# Patient Record
Sex: FEMALE | Race: WHITE | Employment: FULL TIME | URBAN - METROPOLITAN AREA
[De-identification: names, ages, dates, MRNs, and addresses within clinical notes are randomized per-mention and may not be internally consistent; named-entity substitution may affect disease eponyms.]

---

## 2019-05-01 ENCOUNTER — APPOINTMENT (OUTPATIENT)
Dept: GENERAL RADIOLOGY | Age: 64
End: 2019-05-01
Payer: MEDICAID

## 2019-05-01 ENCOUNTER — HOSPITAL ENCOUNTER (EMERGENCY)
Age: 64
Discharge: HOME | End: 2019-05-01
Payer: MEDICAID

## 2019-05-01 VITALS
DIASTOLIC BLOOD PRESSURE: 71 MMHG | RESPIRATION RATE: 16 BRPM | TEMPERATURE: 98.5 F | OXYGEN SATURATION: 93 % | WEIGHT: 285 LBS | HEIGHT: 64 IN | SYSTOLIC BLOOD PRESSURE: 183 MMHG | HEART RATE: 79 BPM | BODY MASS INDEX: 48.65 KG/M2

## 2019-05-01 DIAGNOSIS — M25.561 ACUTE PAIN OF RIGHT KNEE: Primary | ICD-10-CM

## 2019-05-01 PROCEDURE — 73560 X-RAY EXAM OF KNEE 1 OR 2: CPT

## 2019-05-01 PROCEDURE — 99283 EMERGENCY DEPT VISIT LOW MDM: CPT

## 2019-05-01 RX ORDER — NAPROXEN 500 MG/1
500 TABLET ORAL 2 TIMES DAILY
Qty: 20 TABLET | Refills: 0 | Status: SHIPPED | OUTPATIENT
Start: 2019-05-01 | End: 2019-06-13

## 2019-05-01 RX ORDER — LIDOCAINE 50 MG/G
1 PATCH TOPICAL DAILY
Qty: 30 PATCH | Refills: 0 | Status: SHIPPED | OUTPATIENT
Start: 2019-05-01 | End: 2019-06-13

## 2019-05-01 ASSESSMENT — ENCOUNTER SYMPTOMS
ABDOMINAL PAIN: 0
SHORTNESS OF BREATH: 0
BACK PAIN: 0
NAUSEA: 0
DIARRHEA: 0
COLOR CHANGE: 0
VOMITING: 0
WHEEZING: 0

## 2019-05-01 ASSESSMENT — PAIN SCALES - GENERAL: PAINLEVEL_OUTOF10: 8

## 2019-05-01 NOTE — ED PROVIDER NOTES
[facility]        Pt Name: Vito Montana  MRN: [id number]  [place] 0/52/3009  Date of evaluation: 5/1/2019  Provider: Raghu Matias PA-C  PCP: Porter Styles    This patient was not seen and evaluated by the attending physician No att. providers found. CHIEF COMPLAINT       Chief Complaint   Patient presents with    Knee Pain     Pt reporting overworking R knee several weeks ago, several days ago had R knee bent under her and then felt pain when getting up. Pain with knee flexion       HISTORY OF PRESENT ILLNESS   (Location/Symptom, Timing/Onset, Context/Setting, Quality, Duration, Modifying Factors, Severity)  Note limiting factors. Vito Montana is a 66 y.o. female patient presents the emergency department for evaluation of right knee pain. Patient states she was sitting on her couch the other day with her knee in full flexion underneath her. Patient states that she went to stand up she had difficulty and pain. 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 Patient has concerns that her right knee is \"going out\". Patient states the pain is worse with knee flexion. Patient states the pain decreases largely with leg extension. Patient states when she's not moving she's not had a high degree of pain but with knee flexion the pain goes to an 8/10. Patient has not taken any medication for this pain at this time. Patient states it does not radiate. She does not have any numbness or tingling into her foot. She does not have any back pain, loss of bowel or bladder or saddle anesthesia. Patient denies any trauma to the area. Nursing Notes were all reviewed and agreed with or any disagreements were addressed  in the HPI. REVIEW OF SYSTEMS    (2-9 systems for level 4, 10 or more for level 5)     Review of Systems   Constitutional: Negative for fatigue and fever. HENT: Negative.     Eyes: Negative for visual disturbance. Respiratory: Negative for shortness of breath and wheezing. Cardiovascular: Negative for chest pain and palpitations. Gastrointestinal: Negative for abdominal pain, diarrhea, nausea and vomiting. Genitourinary: Negative for dysuria. Musculoskeletal: Positive for arthralgias (right knee). Negative for back pain, gait problem, joint swelling, myalgias, neck pain and neck stiffness. Skin: Negative for color change, pallor, rash and wound. Neurological: Negative for dizziness, syncope, light-headedness and headaches. Positives and Pertinent negatives as per HPI. Except as noted abovein the ROS, all other systems were reviewed and negative. PAST MEDICAL HISTORY   History reviewed. No pertinent past medical history. SURGICAL HISTORY     Past Surgical History:   Procedure Laterality Date    GALLBLADDER SURGERY  1988    KNEE ARTHROSCOPY  09.22.09         CURRENTMEDICATIONS       Discharge Medication List as of 5/1/2019  4:59 PM      CONTINUE these medications which have NOT CHANGED    Details   sertraline (ZOLOFT) 100 MG tablet Take 100 mg by mouth 3 times daily. triamterene-hydrochlorothiazide (DYAZIDE) 37.5-25 MG per capsule Take 1 Cap by mouth every morning. fenofibrate micronized (LOFIBRA) 134 MG capsule Take 134 mg by mouth every morning (before breakfast). ALLERGIES     Erythromycin;  Penicillins; and Nickel    FAMILYHISTORY       Family History   Problem Relation Age of Onset    Asthma Other     Cancer Other     Heart Disease Other     High Blood Pressure Other     Kidney Disease Other           SOCIAL HISTORY       Social History     Socioeconomic History    Marital status: Single     Spouse name: None    Number of children: None    Years of education: None    Highest education level: None   Occupational History    None   Social Needs    Financial resource strain: None    Food insecurity:     Worry: None     Inability: None    Transportation needs:     Medical: None     Non-medical: None   Tobacco Use    Smoking status: Passive Smoke Exposure - Never Smoker   Substance and Sexual Activity    Alcohol use: No    Drug use: No    Sexual activity: None   Lifestyle    Physical activity:     Days per week: None     Minutes per session: None    Stress: None   Relationships    Social connections:     Talks on phone: None     Gets together: None     Attends Anglican service: None     Active member of club or organization: None     Attends meetings of clubs or organizations: None     Relationship status: None    Intimate partner violence:     Fear of current or ex partner: None     Emotionally abused: None     Physically abused: None     Forced sexual activity: None   Other Topics Concern    None   Social History Narrative    None       SCREENINGS             PHYSICAL EXAM    (up to 7 for level 4, 8 or more for level 5)     ED Triage Vitals   BP Temp Temp src Pulse Resp SpO2 Height Weight   05/01/19 1611 05/01/19 1609 -- 05/01/19 1609 05/01/19 1609 05/01/19 1609 05/01/19 1609 05/01/19 1609   (!) 183/71 98.5 °F (36.9 °C)  79 16 93 % 5' 4\" (1.626 m) 285 lb (129.3 kg)       Physical Exam   Constitutional: She is oriented to person, place, and time. She appears well-developed and well-nourished. HENT:   Head: Normocephalic and atraumatic. Nose: Nose normal.   Mouth/Throat: Oropharynx is clear and moist.   Eyes: Conjunctivae and EOM are normal. Right eye exhibits no discharge. Left eye exhibits no discharge. Neck: Normal range of motion. Neck supple. Pulmonary/Chest: Effort normal. No respiratory distress. Musculoskeletal: Normal range of motion. Right knee: She exhibits normal range of motion, no swelling, no effusion, no ecchymosis, no deformity and no bony tenderness. No tenderness found. Patient's right knee is grossly normal. No swelling, erythema, warmth, laxity felt with anterior drawer testing.  Patient has pain but no laxity felt with Yi's. Mild crepitus felt with flexion and extension. Capillary refill is less than 2 seconds. Normal sensation bilaterally. Dorsalis pedis pulses 2+. Neurological: She is alert and oriented to person, place, and time. Skin: Skin is warm and dry. Capillary refill takes less than 2 seconds. No rash noted. She is not diaphoretic. No erythema. No pallor. Psychiatric: She has a normal mood and affect. Her behavior is normal.   Nursing note and vitals reviewed. DIAGNOSTIC RESULTS   LABS:    Labs Reviewed - No data to display    All other labs were within normal range or not returned as of this dictation. EKG: All EKG's are interpreted by the Emergency Department Physician who either signs orCo-signs this chart in the absence of a cardiologist.  Please see their note for interpretation of EKG. RADIOLOGY:   Non-plain film images such as CT, Ultrasound and MRI are read by the radiologist. Plain radiographic images are visualized andpreliminarily interpreted by the  ED Provider with the below findings:        Interpretation perthe Radiologist below, if available at the time of this note:    XR KNEE RIGHT (1-2 VIEWS)   Final Result   No acute abnormality. [unfilled]      PROCEDURES   Unless otherwise noted below, none     Procedures    CRITICAL CARE TIME   N/A    CONSULTS:  None      EMERGENCY DEPARTMENT COURSE and DIFFERENTIALDIAGNOSIS/MDM:   Vitals:    Vitals:    05/01/19 1609 05/01/19 1611   BP:  (!) 183/71   Pulse: 79    Resp: 16    Temp: 98.5 °F (36.9 °C)    SpO2: 93%    Weight: 285 lb (129.3 kg)    Height: 5' 4\" (1.626 m)        Patient was given thefollowing medications:  Medications - No data to display    Patient presents the emergency department for evaluation of right knee pain. Patient denies trauma to the area. Patient is alert and oriented distress. Patient has normal sensations bilaterally. Capillary refills less than 2 seconds and dorsalis pedis pulses 2+. Patient has crepitus when knee is extended or flexed. No laxity is felt with anterior drawer. Patient has pain with Yi's but no pain to palpation of the medial or lateral aspect of the knee. The knee is non-edematous non-erythematous and without additional warmth. X-ray shows no acute osseous abnormality. Patient does have joint narrowing likely degenerative due to age. Patient was given an Ace wrap for comfort and given prescription for anti-inflammatory medications. Patient told to utilize RICE therapy at home and to follow up with her orthopedic surgeon who did her previous knee replacement. Patient was also given follow-up to new orthopedic in case she cannot get a hold of her old orthopedic. At this time I feel the patient is at low risk for compartment syndrome, acute bony fracture, meniscal or ligamentous tear. FINAL IMPRESSION      1.  Acute pain of right knee          DISPOSITION/PLAN   DISPOSITION Decision To Discharge 05/01/2019 04:55:52 PM      PATIENT REFERREDTO:  [facility] Emergency Department  [address] [facility]  [phone]    If symptoms worsen    Clay Clark MD  [address] [facility], [address]  [address] [place] [address]  [phone]    Schedule an appointment as soon as possible for a visit in 3 days  for re-evaluation      DISCHARGE MEDICATIONS:  Discharge Medication List as of 5/1/2019  4:59 PM      START taking these medications    Details   naproxen (NAPROSYN) 500 MG tablet Take 1 tablet by mouth 2 times daily, Disp-20 tablet, R-0Print      lidocaine (LIDODERM) 5 % Place 1 patch onto the skin daily 12 hours on, 12 hours off., Disp-30 patch, R-0Print             DISCONTINUED MEDICATIONS:  Discharge Medication List as of 5/1/2019  4:59 PM                 (Please note that portions ofthis note were completed with a voice recognition program.  Efforts were made to edit the dictations but occasionally words are mis-transcribed.)    Stephan De Leon PA-C (electronically signed)            Ebony Chinchilla PA-C  05/01/19 [id number]

## 2019-05-09 ENCOUNTER — OFFICE VISIT (OUTPATIENT)
Dept: ORTHOPEDIC SURGERY | Age: 64
End: 2019-05-09
Payer: MEDICAID

## 2019-05-09 DIAGNOSIS — M17.11 PRIMARY OSTEOARTHRITIS OF RIGHT KNEE: ICD-10-CM

## 2019-05-09 DIAGNOSIS — M25.561 ACUTE PAIN OF RIGHT KNEE: Primary | ICD-10-CM

## 2019-05-09 PROCEDURE — 99203 OFFICE O/P NEW LOW 30 MIN: CPT | Performed by: PHYSICIAN ASSISTANT

## 2019-05-09 PROCEDURE — 20610 DRAIN/INJ JOINT/BURSA W/O US: CPT | Performed by: PHYSICIAN ASSISTANT

## 2019-05-10 NOTE — PROGRESS NOTES
This dictation was done with [employer] dictation and may contain mechanical errors related to translation. The review of systems was currently provided by the patient and reviewed with the medical assistant at today's visit. Please see media. Subjective:  Timo Price is a 66 y.o. who is here as a new patient to [facility]. She is complaining of pain in her right knee that started about a week ago she can't remember specific injury she states that the pain can be a 9 out of 10 with exertion. Her past medical history shows that she has had some arthritis in the past but it's not been affecting her until last week or so she was sent for x-rays including a standing AP lateral and a sunrise view of her right knee. Patient Active Problem List   Diagnosis    Primary localized osteoarthrosis, lower leg           Current Outpatient Medications on File Prior to Visit   Medication Sig Dispense Refill    naproxen (NAPROSYN) 500 MG tablet Take 1 tablet by mouth 2 times daily 20 tablet 0    lidocaine (LIDODERM) 5 % Place 1 patch onto the skin daily 12 hours on, 12 hours off. 30 patch 0    sertraline (ZOLOFT) 100 MG tablet Take 100 mg by mouth 3 times daily.  triamterene-hydrochlorothiazide (DYAZIDE) 37.5-25 MG per capsule Take 1 Cap by mouth every morning.  fenofibrate micronized (LOFIBRA) 134 MG capsule Take 134 mg by mouth every morning (before breakfast). No current facility-administered medications on file prior to visit. Objective:   Blood pressure (!) 165/83, pulse 71, temperature 97.2 °F (36.2 °C), height 5' 4\" (1.626 m), weight 285 lb (129.3 kg). On examination this is a very pleasant 59-year-old female no acute distress she is alert and oriented ×3 she can walk without antalgia. She has no varus or valgus laxity no cutaneous lesions or lymphadenopathy.  She has good distal pulses good dorsiflexion and plantar flexion strength she's mildly positive for Yi on the right knee she is negative for a Lockman she is negative for pivot shift. Neuro exam grossly intact both lower extremities. Intact sensation to light touch. Motor exam 4+ to 5/5 in all major motor groups. Negative Olmedo's sign. Skin is warm, dry and intact with out erythema or significant increased temperature around the knee joint(s). There are no cutaneous lesions or lymphadenopathy present. X-RAYS:  X-rays taken the office today show very minimal joint space narrowing no fractures or other significant bone abnormality and this was shown to the patient        Assessment:  Right knee mild osteoarthritis bursitis and probable medial meniscus injury    Plan:  During today's visit, there was approximately 30 minutes of face-to-face discussion in regards to the patient's current condition and treatment options. More than 50 % of the time was counseling and coordination of care. We went over the risks and benefits rationale treatment and initially we will try a cortisone injection home exercises and anti-inflammatories over-the-counter. She'll follow up with us in 3-4 weeks if she is not significantly better we would consider getting an MRI at that point      PROCEDURE NOTE:  After a discussion of the multiple options, they consented to a cortisone shot. 1 ml of 40mg/ml Kenalog and 2 ml's of 0.25%Marcaine were injected into the right knee joint space. The leg was slightly flexed and injected just lateral to the patella tendon to under the patella. This was done with sterile technique and they tolerated it well.           They will schedule a follow up in one month    Kenalog:  NDC: 9974-9536-16  Lot Number: [id number]  Expiration Date: 4/20

## 2019-05-13 ENCOUNTER — TELEPHONE (OUTPATIENT)
Dept: ORTHOPEDIC SURGERY | Age: 64
End: 2019-05-13

## 2019-05-13 DIAGNOSIS — M25.561 ACUTE PAIN OF RIGHT KNEE: Primary | ICD-10-CM

## 2019-05-13 NOTE — TELEPHONE ENCOUNTER
Patient called states that her pain symptoms have not improved since the injection on 5/9/2019. She is calling to see what the next step should be?     Please call patient to discuss:  [phone]

## 2019-05-14 VITALS
TEMPERATURE: 97.2 F | WEIGHT: 285 LBS | BODY MASS INDEX: 48.65 KG/M2 | DIASTOLIC BLOOD PRESSURE: 83 MMHG | HEART RATE: 71 BPM | SYSTOLIC BLOOD PRESSURE: 165 MMHG | HEIGHT: 64 IN

## 2019-05-23 ENCOUNTER — HOSPITAL ENCOUNTER (OUTPATIENT)
Dept: MRI IMAGING | Age: 64
Discharge: HOME | End: 2019-05-23
Payer: MEDICAID

## 2019-05-23 DIAGNOSIS — M25.561 ACUTE PAIN OF RIGHT KNEE: ICD-10-CM

## 2019-05-23 PROCEDURE — 73721 MRI JNT OF LWR EXTRE W/O DYE: CPT

## 2019-05-24 ENCOUNTER — TELEPHONE (OUTPATIENT)
Dept: ORTHOPEDIC SURGERY | Age: 64
End: 2019-05-24

## 2019-05-24 NOTE — TELEPHONE ENCOUNTER
Called and told her that this would be done on Tuesday and she was ok with this.   She stated that she is doing much better than she was

## 2019-05-24 NOTE — TELEPHONE ENCOUNTER
Patient called states she has completed her MRI. She is calling to obtain the results.     Please call patient:  [phone]

## 2019-05-30 ENCOUNTER — OFFICE VISIT (OUTPATIENT)
Dept: ORTHOPEDIC SURGERY | Age: 64
End: 2019-05-30
Payer: MEDICAID

## 2019-05-30 DIAGNOSIS — M17.11 PRIMARY OSTEOARTHRITIS OF RIGHT KNEE: Primary | ICD-10-CM

## 2019-05-30 DIAGNOSIS — S83.241A TEAR OF MEDIAL MENISCUS OF RIGHT KNEE, CURRENT, UNSPECIFIED TEAR TYPE, INITIAL ENCOUNTER: ICD-10-CM

## 2019-05-30 PROCEDURE — 99214 OFFICE O/P EST MOD 30 MIN: CPT | Performed by: PHYSICIAN ASSISTANT

## 2019-05-30 NOTE — LETTER
[facility]  Surgery Scheduling Form:  [address]:                                                                                                                  Patient Name:  Tigre Payan  Patient :  1955   Patient SS#:  [id number]    Patient Phone:  [phone] (home)                               Patient Address:  [address] [place] [address]    PCP:  [address] [facility] [address]:   Payor/Plan Subscr  Sex Relation Sub. Ins. ID Effective Group Num   1. Lauren Jhaveri  Female Self [id number] 1/1/15 [id number]                                   [address] [address]   DIAGNOSIS & PROCEDURE:                                                                                              Diagnosis:   right knee medial meniscus tear and plica    Y88.716  Operation:  Right knee scope medial meniscectomy and poss plica excision   [id number]  Location:  [place]  Surgeon:  Eneida Castillo. Leyla Zazueta MD    SCHEDULING INFORMATION:                                                                                         .  Surgeon's Scheduling Instruction:  elective  Requested Date:    OR Time:   Patient Arrival Time:    OR Time Required:  30  Minutes  Anesthesia:  General  Equipment:    Mini C-Arm:  No   Standard C-Arm:  No  Status:  Outpatient  PAT Required:  No  Comments:   ALLERGIES:Erythromycin; Penicillins; and Nickel                      Daron XMagdiel Biggs MD      19 4:37 PM  BILLING INFORMATION:                                                                                                   Procedure:                                           CPT Code Modifier     Right knee scope medial meniscectomy and poss plica excision          With Juanito Boateng, [facility]                  19  4:37 PM   Height: 5' 4\" (162.6 cm),  Weight: 285 lb (129.3 kg)  Patient: Tigre Payan  :    1955  Pre Operative Physician Prophylaxis Orders - SCIP Protocols SUR-  ALLERGIES:    SEE LIST                   Pre-Operative Antibiotic Order:          ?  ----  No Antibiotic Ordered        X  ----  Give the following Antibiotic within 1 hour prior to procedure                                                                           start time:          Ancef 1 gram IV if patient is less than 200 pounds     or        Ancef 2 grams IV if patient is greater than 200 pounds     or       Vancomycin 1 gram IV (over 1 hour) if patient is allergic to         PENICILLINS or CEFALOSPORINS                                                                                        19       4:37PM

## 2019-06-05 VITALS
HEIGHT: 64 IN | WEIGHT: 285 LBS | HEART RATE: 77 BPM | SYSTOLIC BLOOD PRESSURE: 160 MMHG | DIASTOLIC BLOOD PRESSURE: 84 MMHG | TEMPERATURE: 98.3 F | BODY MASS INDEX: 48.65 KG/M2

## 2019-06-06 NOTE — PROGRESS NOTES
This dictation was done with [employer] dictation and may contain mechanical errors related to translation. The review of systems was currently provided by the patient and reviewed with the medical assistant at today's visit. Please see media. Subjective:  Rene Bernard is a 66 y.o. who is here to discuss pain in her right knee. At this visit the X-rays, presenting symptoms, and chief complaint were presented to Sean Peters. Alcira Naranjo M.D. He then evaluated the patient. He spent several minutes discussing face to face with the patient the clinical diagnosis and medical course options,from conservative to aggressive including risks and benefits. Show recent MRI results and the results are listed below:    Impression   Medial compartmental grade 3 chondromalacia changes and degenerative changes   with joint space narrowing, articular cartilage fissuring and extensive   subchondral marrow edema.       Small joint effusion and moderate to large Baker's cyst.  Possible synovitis   and intra-articular loose bodies in the Baker's cyst.       No acute ligamentous or meniscal tear.             Patient Active Problem List   Diagnosis    Primary localized osteoarthrosis, lower leg           Current Outpatient Medications on File Prior to Visit   Medication Sig Dispense Refill    naproxen (NAPROSYN) 500 MG tablet Take 1 tablet by mouth 2 times daily 20 tablet 0    lidocaine (LIDODERM) 5 % Place 1 patch onto the skin daily 12 hours on, 12 hours off. 30 patch 0    sertraline (ZOLOFT) 100 MG tablet Take 100 mg by mouth 3 times daily.  triamterene-hydrochlorothiazide (DYAZIDE) 37.5-25 MG per capsule Take 1 Cap by mouth every morning.  fenofibrate micronized (LOFIBRA) 134 MG capsule Take 134 mg by mouth every morning (before breakfast). No current facility-administered medications on file prior to visit.           Objective:   Blood pressure (!) 160/84, pulse 77, temperature 98.3 °F (36.8 °C), height 5' 4\" (1.626 m), weight 285 lb (129.3 kg). On examination she has crepitus during flexion and extension through the patellofemoral joint she has 1+ edema she has medial and lateral joint line tenderness she has good distal pulses good dorsiflexion plantarflexion strength and is neurologically intact to the right foot. Neuro exam grossly intact both lower extremities. Intact sensation to light touch. Motor exam 4+ to 5/5 in all major motor groups. Negative Olmedo's sign. Skin is warm, dry and intact with out erythema or significant increased temperature around the knee joint(s). There are no cutaneous lesions or lymphadenopathy present. X-RAYS:  MRI films were reviewed showing chondromalacia an effusion thickened lining tissue as well as loose bodies the MRI was negative for a unstable meniscus tear but there was degenerative changes seen      Assessment:  Right knee hypertrophic plica, loose bodies, osteoarthritis    Plan:  During today's visit, there was approximately 25 minutes of face-to-face discussion in regards to the patient's current condition and treatment options. More than 50 % of the time was counseling and coordination of care. This is a lengthy discussion with the patient due to the fact of her age her activity level the findings on x-ray and MRI and her disability we discussed observation only arthroscopy versus total knee replacement.  Ultimately, she decided to proceed to a knee arthroscopy she understands risks and benefits and the rationale treatment she has expectations that it could be helpful but ultimately she could require a total knee replacement      PROCEDURE NOTE:   proceed to right knee arthroscopy      They will schedule a follow up in preoperative with

## 2019-06-13 ENCOUNTER — OFFICE VISIT (OUTPATIENT)
Dept: ORTHOPEDIC SURGERY | Age: 64
End: 2019-06-13
Payer: MEDICAID

## 2019-06-13 VITALS — SYSTOLIC BLOOD PRESSURE: 155 MMHG | HEART RATE: 84 BPM | DIASTOLIC BLOOD PRESSURE: 87 MMHG | TEMPERATURE: 98.4 F

## 2019-06-13 DIAGNOSIS — S83.241A TEAR OF MEDIAL MENISCUS OF RIGHT KNEE, CURRENT, UNSPECIFIED TEAR TYPE, INITIAL ENCOUNTER: Primary | ICD-10-CM

## 2019-06-13 PROCEDURE — 99214 OFFICE O/P EST MOD 30 MIN: CPT | Performed by: ORTHOPAEDIC SURGERY

## 2019-06-13 RX ORDER — IBUPROFEN 200 MG
200 TABLET ORAL EVERY 6 HOURS PRN
Status: ON HOLD | COMMUNITY
End: 2019-06-19 | Stop reason: HOSPADM

## 2019-06-13 NOTE — PROGRESS NOTES
This dictation was done with [employer] dictation and may contain mechanical errors related to translation. Blood pressure (!) 155/87, pulse 84, temperature 98.4 °F (36.9 °C).

## 2019-06-17 RX ORDER — ACETAMINOPHEN 500 MG
500 TABLET ORAL EVERY 6 HOURS PRN
COMMUNITY
End: 2019-10-07

## 2019-06-17 NOTE — PROGRESS NOTES
Pt states, she has allergy to any METALS other than GOLD-but it must be 14K or higher.   She bleeds at the site and ADEEL domingo [facility] office notified per Eliezer Schmidt  Allergy placed in epic

## 2019-06-17 NOTE — PROGRESS NOTES
[address]  time____________        Surgery time____________    Take the following medications with a sip of water:pt to follow med regiment as instructed by our anesthesiologist.    Bring b-pap machine in    Do not eat or drink anything after 12:00 midnight prior to your surgery. This includes water chewing gum, mints and ice chips. You may brush your teeth and gargle the morning of your surgery, but do not swallow the water     Please see your family doctor/pediatrician for a history and physical and/or concerning medications. Bring any test results/reports from your physicians office. If you are under the care of a heart doctor or specialist doctor, please be aware that you may be asked to them for clearance    You may be asked to stop blood thinners such as Coumadin, Plavix, Fragmin, Lovenox, etc., or any anti-inflammatories such as:  Aspirin, Ibuprofen, Advil, Naproxen prior to your surgery. We also ask that you stop any OTC medications such as fish oil, vitamin E, glucosamine, garlic, Multivitamins, COQ 10, etc.    We ask that you do not smoke 24 hours prior to surgery  We ask that you do not  drink any alcoholic beverages 24 hours prior to surgery     You must make arrangements for a responsible adult to take you home after your surgery. For your safety you will not be allowed to leave alone or drive yourself home. Your surgery will be cancelled if you do not have a ride home. Also for your safety, it is strongly suggested that someone stay with you the first 24 hours after your surgery. A parent or legal guardian must accompany a child scheduled for surgery and plan to stay at the hospital until the child is discharged. Please do not bring other children with you. For your comfort, please wear simple loose fitting clothing to the hospital.  Please do not bring valuables.     Do not wear any make-up or nail polish on your fingers or

## 2019-06-18 ENCOUNTER — ANESTHESIA EVENT (OUTPATIENT)
Dept: OPERATING ROOM | Age: 64
End: 2019-06-18
Payer: MEDICAID

## 2019-06-19 ENCOUNTER — ANESTHESIA (OUTPATIENT)
Dept: OPERATING ROOM | Age: 64
End: 2019-06-19
Payer: MEDICAID

## 2019-06-19 ENCOUNTER — HOSPITAL ENCOUNTER (OUTPATIENT)
Age: 64
Setting detail: OUTPATIENT SURGERY
Discharge: HOME | End: 2019-06-19
Attending: ORTHOPAEDIC SURGERY | Admitting: ORTHOPAEDIC SURGERY
Payer: MEDICAID

## 2019-06-19 VITALS
HEART RATE: 88 BPM | HEIGHT: 64 IN | OXYGEN SATURATION: 95 % | WEIGHT: 285 LBS | TEMPERATURE: 96.8 F | SYSTOLIC BLOOD PRESSURE: 128 MMHG | BODY MASS INDEX: 48.65 KG/M2 | RESPIRATION RATE: 16 BRPM | DIASTOLIC BLOOD PRESSURE: 79 MMHG

## 2019-06-19 VITALS
SYSTOLIC BLOOD PRESSURE: 112 MMHG | RESPIRATION RATE: 22 BRPM | DIASTOLIC BLOOD PRESSURE: 58 MMHG | TEMPERATURE: 98.6 F | OXYGEN SATURATION: 97 %

## 2019-06-19 DIAGNOSIS — S83.241A ACUTE MEDIAL MENISCUS TEAR, RIGHT, INITIAL ENCOUNTER: Primary | ICD-10-CM

## 2019-06-19 PROCEDURE — 2580000003 HC RX 258: Performed by: ORTHOPAEDIC SURGERY

## 2019-06-19 PROCEDURE — 3700000001 HC ADD 15 MINUTES (ANESTHESIA): Performed by: ORTHOPAEDIC SURGERY

## 2019-06-19 PROCEDURE — 6360000002 HC RX W HCPCS: Performed by: ORTHOPAEDIC SURGERY

## 2019-06-19 PROCEDURE — 6360000002 HC RX W HCPCS: Performed by: NURSE ANESTHETIST, CERTIFIED REGISTERED

## 2019-06-19 PROCEDURE — 7100000001 HC PACU RECOVERY - ADDTL 15 MIN: Performed by: ORTHOPAEDIC SURGERY

## 2019-06-19 PROCEDURE — 3700000000 HC ANESTHESIA ATTENDED CARE: Performed by: ORTHOPAEDIC SURGERY

## 2019-06-19 PROCEDURE — 6360000002 HC RX W HCPCS: Performed by: ANESTHESIOLOGY

## 2019-06-19 PROCEDURE — 2500000003 HC RX 250 WO HCPCS: Performed by: ORTHOPAEDIC SURGERY

## 2019-06-19 PROCEDURE — 3600000014 HC SURGERY LEVEL 4 ADDTL 15MIN: Performed by: ORTHOPAEDIC SURGERY

## 2019-06-19 PROCEDURE — 2580000003 HC RX 258: Performed by: ANESTHESIOLOGY

## 2019-06-19 PROCEDURE — 2500000003 HC RX 250 WO HCPCS: Performed by: NURSE ANESTHETIST, CERTIFIED REGISTERED

## 2019-06-19 PROCEDURE — 7100000011 HC PHASE II RECOVERY - ADDTL 15 MIN: Performed by: ORTHOPAEDIC SURGERY

## 2019-06-19 PROCEDURE — 2709999900 HC NON-CHARGEABLE SUPPLY: Performed by: ORTHOPAEDIC SURGERY

## 2019-06-19 PROCEDURE — 7100000010 HC PHASE II RECOVERY - FIRST 15 MIN: Performed by: ORTHOPAEDIC SURGERY

## 2019-06-19 PROCEDURE — 7100000000 HC PACU RECOVERY - FIRST 15 MIN: Performed by: ORTHOPAEDIC SURGERY

## 2019-06-19 PROCEDURE — 3600000004 HC SURGERY LEVEL 4 BASE: Performed by: ORTHOPAEDIC SURGERY

## 2019-06-19 RX ORDER — MORPHINE SULFATE 2 MG/ML
1 INJECTION, SOLUTION INTRAMUSCULAR; INTRAVENOUS EVERY 5 MIN PRN
Status: DISCONTINUED | OUTPATIENT
Start: 2019-06-19 | End: 2019-06-19 | Stop reason: HOSPADM

## 2019-06-19 RX ORDER — MEPERIDINE HYDROCHLORIDE 25 MG/ML
12.5 INJECTION INTRAMUSCULAR; INTRAVENOUS; SUBCUTANEOUS EVERY 5 MIN PRN
Status: DISCONTINUED | OUTPATIENT
Start: 2019-06-19 | End: 2019-06-19 | Stop reason: HOSPADM

## 2019-06-19 RX ORDER — PROPOFOL 10 MG/ML
INJECTION, EMULSION INTRAVENOUS PRN
Status: DISCONTINUED | OUTPATIENT
Start: 2019-06-19 | End: 2019-06-19 | Stop reason: SDUPTHER

## 2019-06-19 RX ORDER — MORPHINE SULFATE 4 MG/ML
INJECTION, SOLUTION INTRAMUSCULAR; INTRAVENOUS
Status: COMPLETED | OUTPATIENT
Start: 2019-06-19 | End: 2019-06-19

## 2019-06-19 RX ORDER — MORPHINE SULFATE 2 MG/ML
2 INJECTION, SOLUTION INTRAMUSCULAR; INTRAVENOUS EVERY 5 MIN PRN
Status: DISCONTINUED | OUTPATIENT
Start: 2019-06-19 | End: 2019-06-19 | Stop reason: HOSPADM

## 2019-06-19 RX ORDER — KETOROLAC TROMETHAMINE 30 MG/ML
INJECTION, SOLUTION INTRAMUSCULAR; INTRAVENOUS PRN
Status: DISCONTINUED | OUTPATIENT
Start: 2019-06-19 | End: 2019-06-19 | Stop reason: SDUPTHER

## 2019-06-19 RX ORDER — SODIUM CHLORIDE 0.9 % (FLUSH) 0.9 %
10 SYRINGE (ML) INJECTION EVERY 12 HOURS SCHEDULED
Status: DISCONTINUED | OUTPATIENT
Start: 2019-06-19 | End: 2019-06-19 | Stop reason: HOSPADM

## 2019-06-19 RX ORDER — LIDOCAINE HYDROCHLORIDE 20 MG/ML
INJECTION, SOLUTION EPIDURAL; INFILTRATION; INTRACAUDAL; PERINEURAL PRN
Status: DISCONTINUED | OUTPATIENT
Start: 2019-06-19 | End: 2019-06-19 | Stop reason: SDUPTHER

## 2019-06-19 RX ORDER — BUPIVACAINE HYDROCHLORIDE AND EPINEPHRINE 5; 5 MG/ML; UG/ML
INJECTION, SOLUTION EPIDURAL; INTRACAUDAL; PERINEURAL
Status: COMPLETED | OUTPATIENT
Start: 2019-06-19 | End: 2019-06-19

## 2019-06-19 RX ORDER — ONDANSETRON 2 MG/ML
4 INJECTION INTRAMUSCULAR; INTRAVENOUS
Status: DISCONTINUED | OUTPATIENT
Start: 2019-06-19 | End: 2019-06-19 | Stop reason: HOSPADM

## 2019-06-19 RX ORDER — OXYCODONE HYDROCHLORIDE AND ACETAMINOPHEN 5; 325 MG/1; MG/1
1 TABLET ORAL PRN
Status: DISCONTINUED | OUTPATIENT
Start: 2019-06-19 | End: 2019-06-19 | Stop reason: HOSPADM

## 2019-06-19 RX ORDER — BUPIVACAINE HYDROCHLORIDE 5 MG/ML
INJECTION, SOLUTION EPIDURAL; INTRACAUDAL
Status: COMPLETED | OUTPATIENT
Start: 2019-06-19 | End: 2019-06-19

## 2019-06-19 RX ORDER — SODIUM CHLORIDE 0.9 % (FLUSH) 0.9 %
10 SYRINGE (ML) INJECTION PRN
Status: DISCONTINUED | OUTPATIENT
Start: 2019-06-19 | End: 2019-06-19 | Stop reason: HOSPADM

## 2019-06-19 RX ORDER — OXYCODONE HYDROCHLORIDE AND ACETAMINOPHEN 5; 325 MG/1; MG/1
2 TABLET ORAL PRN
Status: DISCONTINUED | OUTPATIENT
Start: 2019-06-19 | End: 2019-06-19 | Stop reason: HOSPADM

## 2019-06-19 RX ORDER — OXYCODONE HYDROCHLORIDE AND ACETAMINOPHEN 5; 325 MG/1; MG/1
1-2 TABLET ORAL EVERY 6 HOURS PRN
Qty: 10 TABLET | Refills: 0 | Status: SHIPPED | OUTPATIENT
Start: 2019-06-19 | End: 2019-07-01 | Stop reason: SDUPTHER

## 2019-06-19 RX ORDER — HYDRALAZINE HYDROCHLORIDE 20 MG/ML
5 INJECTION INTRAMUSCULAR; INTRAVENOUS
Status: DISCONTINUED | OUTPATIENT
Start: 2019-06-19 | End: 2019-06-19 | Stop reason: HOSPADM

## 2019-06-19 RX ORDER — NAPROXEN 500 MG/1
500 TABLET ORAL 2 TIMES DAILY WITH MEALS
Qty: 20 TABLET | Refills: 3 | Status: SHIPPED | OUTPATIENT
Start: 2019-06-19 | End: 2019-10-07

## 2019-06-19 RX ORDER — MIDAZOLAM HYDROCHLORIDE 1 MG/ML
INJECTION INTRAMUSCULAR; INTRAVENOUS PRN
Status: DISCONTINUED | OUTPATIENT
Start: 2019-06-19 | End: 2019-06-19 | Stop reason: SDUPTHER

## 2019-06-19 RX ORDER — SODIUM CHLORIDE 9 MG/ML
INJECTION, SOLUTION INTRAVENOUS CONTINUOUS
Status: DISCONTINUED | OUTPATIENT
Start: 2019-06-19 | End: 2019-06-19 | Stop reason: HOSPADM

## 2019-06-19 RX ORDER — DEXAMETHASONE SODIUM PHOSPHATE 4 MG/ML
INJECTION, SOLUTION INTRA-ARTICULAR; INTRALESIONAL; INTRAMUSCULAR; INTRAVENOUS; SOFT TISSUE PRN
Status: DISCONTINUED | OUTPATIENT
Start: 2019-06-19 | End: 2019-06-19 | Stop reason: SDUPTHER

## 2019-06-19 RX ORDER — LABETALOL HYDROCHLORIDE 5 MG/ML
5 INJECTION, SOLUTION INTRAVENOUS EVERY 10 MIN PRN
Status: DISCONTINUED | OUTPATIENT
Start: 2019-06-19 | End: 2019-06-19 | Stop reason: HOSPADM

## 2019-06-19 RX ORDER — ONDANSETRON 2 MG/ML
INJECTION INTRAMUSCULAR; INTRAVENOUS PRN
Status: DISCONTINUED | OUTPATIENT
Start: 2019-06-19 | End: 2019-06-19 | Stop reason: SDUPTHER

## 2019-06-19 RX ORDER — MAGNESIUM HYDROXIDE 1200 MG/15ML
LIQUID ORAL CONTINUOUS PRN
Status: COMPLETED | OUTPATIENT
Start: 2019-06-19 | End: 2019-06-19

## 2019-06-19 RX ADMIN — Medication 3 G: at 12:21

## 2019-06-19 RX ADMIN — HYDROMORPHONE HYDROCHLORIDE 0.25 MG: 1 INJECTION, SOLUTION INTRAMUSCULAR; INTRAVENOUS; SUBCUTANEOUS at 13:25

## 2019-06-19 RX ADMIN — HYDROMORPHONE HYDROCHLORIDE 0.25 MG: 1 INJECTION, SOLUTION INTRAMUSCULAR; INTRAVENOUS; SUBCUTANEOUS at 13:42

## 2019-06-19 RX ADMIN — HYDROMORPHONE HYDROCHLORIDE 0.25 MG: 1 INJECTION, SOLUTION INTRAMUSCULAR; INTRAVENOUS; SUBCUTANEOUS at 13:34

## 2019-06-19 RX ADMIN — DEXAMETHASONE SODIUM PHOSPHATE 4 MG: 4 INJECTION, SOLUTION INTRAMUSCULAR; INTRAVENOUS at 12:31

## 2019-06-19 RX ADMIN — HYDROMORPHONE HYDROCHLORIDE 0.5 MG: 1 INJECTION, SOLUTION INTRAMUSCULAR; INTRAVENOUS; SUBCUTANEOUS at 12:56

## 2019-06-19 RX ADMIN — PROPOFOL 160 MG: 10 INJECTION, EMULSION INTRAVENOUS at 12:26

## 2019-06-19 RX ADMIN — HYDROMORPHONE HYDROCHLORIDE 0.5 MG: 1 INJECTION, SOLUTION INTRAMUSCULAR; INTRAVENOUS; SUBCUTANEOUS at 12:51

## 2019-06-19 RX ADMIN — HYDROMORPHONE HYDROCHLORIDE 1 MG: 1 INJECTION, SOLUTION INTRAMUSCULAR; INTRAVENOUS; SUBCUTANEOUS at 12:24

## 2019-06-19 RX ADMIN — KETOROLAC TROMETHAMINE 30 MG: 30 INJECTION, SOLUTION INTRAMUSCULAR at 12:54

## 2019-06-19 RX ADMIN — LIDOCAINE HYDROCHLORIDE 50 MG: 20 INJECTION, SOLUTION EPIDURAL; INFILTRATION; INTRACAUDAL; PERINEURAL at 12:24

## 2019-06-19 RX ADMIN — ONDANSETRON 4 MG: 2 INJECTION INTRAMUSCULAR; INTRAVENOUS at 12:36

## 2019-06-19 RX ADMIN — SODIUM CHLORIDE: 9 INJECTION, SOLUTION INTRAVENOUS at 10:18

## 2019-06-19 RX ADMIN — MIDAZOLAM 2 MG: 1 INJECTION INTRAMUSCULAR; INTRAVENOUS at 12:20

## 2019-06-19 ASSESSMENT — PULMONARY FUNCTION TESTS
PIF_VALUE: 18
PIF_VALUE: 30
PIF_VALUE: 30
PIF_VALUE: 12
PIF_VALUE: 14
PIF_VALUE: 30
PIF_VALUE: 14
PIF_VALUE: 20
PIF_VALUE: 1
PIF_VALUE: 5
PIF_VALUE: 30
PIF_VALUE: 1
PIF_VALUE: 14
PIF_VALUE: 12
PIF_VALUE: 10
PIF_VALUE: 17
PIF_VALUE: 18
PIF_VALUE: 11
PIF_VALUE: 24
PIF_VALUE: 24
PIF_VALUE: 21
PIF_VALUE: 12
PIF_VALUE: 12
PIF_VALUE: 30
PIF_VALUE: 21
PIF_VALUE: 10
PIF_VALUE: 12
PIF_VALUE: 11
PIF_VALUE: 3
PIF_VALUE: 30
PIF_VALUE: 12
PIF_VALUE: 1
PIF_VALUE: 12
PIF_VALUE: 1
PIF_VALUE: 30
PIF_VALUE: 24
PIF_VALUE: 10
PIF_VALUE: 0
PIF_VALUE: 21
PIF_VALUE: 1

## 2019-06-19 ASSESSMENT — PAIN DESCRIPTION - PAIN TYPE
TYPE: SURGICAL PAIN

## 2019-06-19 ASSESSMENT — PAIN DESCRIPTION - LOCATION
LOCATION: KNEE

## 2019-06-19 ASSESSMENT — PAIN SCALES - GENERAL
PAINLEVEL_OUTOF10: 3
PAINLEVEL_OUTOF10: 5
PAINLEVEL_OUTOF10: 3

## 2019-06-19 ASSESSMENT — PAIN DESCRIPTION - FREQUENCY
FREQUENCY: CONTINUOUS

## 2019-06-19 ASSESSMENT — PAIN DESCRIPTION - ONSET
ONSET: ON-GOING

## 2019-06-19 ASSESSMENT — PAIN DESCRIPTION - DESCRIPTORS
DESCRIPTORS: OTHER (COMMENT)
DESCRIPTORS: THROBBING
DESCRIPTORS: THROBBING

## 2019-06-19 ASSESSMENT — PAIN DESCRIPTION - ORIENTATION
ORIENTATION: RIGHT

## 2019-06-19 ASSESSMENT — PAIN DESCRIPTION - PROGRESSION
CLINICAL_PROGRESSION: NOT CHANGED
CLINICAL_PROGRESSION: NOT CHANGED

## 2019-06-19 ASSESSMENT — PAIN - FUNCTIONAL ASSESSMENT: PAIN_FUNCTIONAL_ASSESSMENT: 0-10

## 2019-06-19 NOTE — ANESTHESIA PRE PROCEDURE
[facility] Department of Anesthesiology  Pre-Anesthesia Evaluation/Consultation       Name:  Latesha Bates  : 1955  Age:  66 y.o. MRN:  [id number]  Date: 2019           Surgeon: Surgeon(s):  Ann Olmos MD    Procedure: Procedure(s):  RIGHT KNEE ARTHROSCOPY, MEDIAL MENISCECTOMY AND POSSIBLE PLICA EXCISION     Allergies   Allergen Reactions    Penicillins Other (See Comments)     Pt was a child and stopped breathing on this medication    Other Other (See Comments)     Pt states, any METALS other than GOLD-but it must be 14K or higher.  Erythromycin Nausea And Vomiting    Nickel Rash     Patient Active Problem List   Diagnosis    Primary localized osteoarthrosis, lower leg     Past Medical History:   Diagnosis Date    Arthritis     Depression     Hyperlipidemia     Hypertension     Psoriasis     Sleep apnea     bi pap machine    Wears dentures      Past Surgical History:   Procedure Laterality Date    DENTAL SURGERY      implants    EYE SURGERY      bilateral    GALLBLADDER SURGERY      KNEE ARTHROSCOPY  09     Social History     Tobacco Use    Smoking status: Former Smoker     Packs/day: 0.50     Types: Cigarettes    Smokeless tobacco: Never Used   Substance Use Topics    Alcohol use: No    Drug use: No     Medications  No current facility-administered medications on file prior to encounter.       Current Outpatient Medications on File Prior to Encounter   Medication Sig Dispense Refill    acetaminophen (TYLENOL) 500 MG tablet Take 500 mg by mouth every 6 hours as needed for Pain      sertraline (ZOLOFT) 100 MG tablet Take 300 mg by mouth daily        Current Facility-Administered Medications   Medication Dose Route Frequency Provider Last Rate Last Dose    0.9 % sodium chloride infusion   Intravenous Continuous Negrito Inrgam MD 75 mL/hr at 19 1018      sodium chloride flush 0.9 % injection 10 mL  10 mL Intravenous 2 times per day Rosalia Brandt MD        sodium chloride flush 0.9 % injection 10 mL  10 mL Intravenous PRN Rosalia Brandt MD        vancomycin (VANCOCIN) 1500 mg in dextrose 5 % 250 mL IVPB  1,500 mg Intravenous On Call to OR Donlad Lugo MD         Vital Signs (Current)   Vitals:    19 1217 19 0954   BP:  130/75   Pulse:  75   Resp:  18   Temp:  97.4 °F (36.3 °C)   TempSrc:  Temporal   SpO2:  96%   Weight: 285 lb (129.3 kg)    Height: 5' 4\" (1.626 m)                                           BP Readings from Last 3 Encounters:   19 130/75   19 (!) 155/87   19 (!) 160/84     Vital Signs Statistics (for past 48 hrs)     Temp  Av.4 °F (36.3 °C)  Min: 97.4 °F (36.3 °C)   Min taken time: 19 0954  Max: 97.4 °F (36.3 °C)   Max taken time: 19 0954  Pulse  Av  Min: 76   Min taken time: 19 0954  Max: 76   Max taken time: 19 0954  Resp  Av  Min: 25   Min taken time: 19 0954  Max: 25   Max taken time: 19 0954  BP  Min: 130/75   Min taken time: 19 0954  Max: 130/75   Max taken time: 19 0954  SpO2  Av %  Min: 96 %   Min taken time: 19 0954  Max: 96 %   Max taken time: 19 0954  BP Readings from Last 3 Encounters:   19 130/75   19 (!) 155/87   19 (!) 160/84       BMI  Body mass index is 48.92 kg/m². Estimated body mass index is 48.92 kg/m² as calculated from the following:    Height as of this encounter: 5' 4\" (1.626 m). Weight as of this encounter: 285 lb (129.3 kg).     CBC   Lab Results   Component Value Date    WBC 5.6 2010    RBC 3.76 2010    HGB 10.0 2010    HCT 27.8 2010    MCV 90.4 2010    RDW 13.8 2010     2010     CMP    Lab Results   Component Value Date     2010    K 3.3 2010     2010    CO2 28 2010    BUN 11 2010    CREATININE 0.6 2010    GFRAA >60 2010 GLUCOSE 110 04/06/2010    CALCIUM 9.4 04/06/2010     BMP    Lab Results   Component Value Date     04/06/2010    K 3.3 04/06/2010     04/06/2010    CO2 28 04/06/2010    BUN 11 04/06/2010    CREATININE 0.6 04/06/2010    CALCIUM 9.4 04/06/2010    GFRAA >60 04/06/2010    GLUCOSE 110 04/06/2010     POCGlucose  No results for input(s): GLUCOSE in the last 72 hours. Coags    Lab Results   Component Value Date    PROTIME 29.7 04/19/2010    INR 2.72 04/19/2010    APTT 34.5 01/49/4697     HCG (If Applicable) No results found for: PREGTESTUR, PREGSERUM, HCG, HCGQUANT   ABGs No results found for: PHART, PO2ART, QXF1NEI, QMO9JSZ, BEART, X1IZCSSK   Type & Screen (If Applicable)  Lab Results   Component Value Date    LABABO A 04/06/2010    [facility] RORO Positive 04/06/2010                            BMI: Wt Readings from Last 3 Encounters:       NPO Status:   Date of last liquid consumption: 06/18/19   Time of last liquid consumption: 2330   Date of last solid food consumption: 06/18/19      Time of last solid consumption: 2345       Anesthesia Evaluation    Airway: Mallampati: III  TM distance: >3 FB   Neck ROM: full  Mouth opening: > = 3 FB Dental:          Pulmonary:   (+) sleep apnea:                             Cardiovascular:    (+) hypertension:, hyperlipidemia                  Neuro/Psych:   (+) psychiatric history:            GI/Hepatic/Renal:             Endo/Other:    (+) : arthritis:., .                 Abdominal:           Vascular:                                        Anesthesia Plan      general     ASA 3     (I discussed with the patient the risks and benefits of PIV, general anesthesia, IV Narcotics, PACU. All questions were answered the patient agrees with the plan.)  Induction: intravenous. Anesthetic plan and risks discussed with patient. Plan discussed with CRNA.                   This pre-anesthesia assessment may be used as a history and physical.    DOS STAFF ADDENDUM:    Pt seen and

## 2019-06-19 NOTE — ANESTHESIA POSTPROCEDURE EVALUATION
Department of Anesthesiology  Postprocedure Note    Patient: Radha Peters  MRN: [id number]  YOB: 1955  Date of evaluation: 6/19/2019  Time:  2:47 PM     Procedure Summary     Date:  06/19/19 Room / Location:  [email or web address] OR 01 / [email or web address] OR    Anesthesia Start:  1222 Anesthesia Stop:  1303    Procedure:  DIAGNOSTIC RIGHT KNEE ARTHROSCOPY, PARTIAL MEDIAL AND LATERAL MENISCECTOMIES AND  PLICA EXCISION (Right Knee) Diagnosis:       Knee meniscus pain, right      (RIGHT KNEE MEDIAL MENISCUS TEAR AND PLICA)    Surgeon:  Gianna Andres MD Responsible Provider:  Дмитрий Gerard MD    Anesthesia Type:  general ASA Status:  3          Anesthesia Type: general    Denisse Phase I: Denisse Score: 10    Denisse Phase II: Denisse Score: 10    Last vitals: Reviewed and per EMR flowsheets.        Anesthesia Post Evaluation    Patient location during evaluation: PACU  Level of consciousness: awake and alert  Airway patency: patent  Nausea & Vomiting: no nausea and no vomiting  Complications: no  Cardiovascular status: blood pressure returned to baseline  Respiratory status: acceptable  Hydration status: euvolemic  Comments: Postoperative Anesthesia Note    Name:    Radha Peters  MRN:      [id number]    Patient Vitals in the past 12 hrs:  06/19/19 1421, BP:(!) 144/79, Temp:96.8 °F (36 °C), Temp src:Temporal, Pulse:69, Resp:16, SpO2:95 %  06/19/19 1405, BP:131/73, Pulse:69, Resp:14, SpO2:96 %  06/19/19 1400, BP:(!) 118/58, Pulse:76, Resp:13, SpO2:92 %  06/19/19 1355, BP:136/76, Pulse:71, Resp:13, SpO2:92 %  06/19/19 1350, BP:138/70, Pulse:72, Resp:16, SpO2:91 %  06/19/19 1347, Temp:97 °F (36.1 °C), Temp src:Temporal  06/19/19 1345, Pulse:82, Resp:13, SpO2:93 %  06/19/19 1340, BP:135/74, Pulse:85, Resp:17, SpO2:95 %  06/19/19 1335, BP:132/69, Pulse:78, Resp:13, SpO2:98 %  06/19/19 1330, BP:137/74, Pulse:77, Resp:13, SpO2:95 %  06/19/19 1325, BP:134/79, Pulse:85, Resp:19, SpO2:98 %  06/19/19 1320, BP:111/70, Pulse:71,

## 2019-06-20 NOTE — OP NOTE
high-grade  chondromalacia not only of patella facets but also the trochlea of the  femur, grade 3 to near grade 4 changes were seen throughout. The ACL  was viewed and it was definitively lax in nature though the patient had  no previous history of injury. The PCL was intact and medial and  lateral collateral ligaments were intact. The scope was eventually introduced into the lateral compartment, which  showed some grade 1 to 2 changes of the lateral femur and lateral  proximal tibia. There was a large, what appeared to be, acute meniscal  tear of the lateral meniscus. Debriding instrument was placed within  this and partial lateral meniscectomy was performed. The medial side  was then viewed and there was again severe chondral changes of the  distal femur, grade 3 to near grade 4 with grade 2 to 3 changes on the  proximal tibia. On the medial side, there was an acute and  acute-on-chronic posterior meniscal tear. Debriding and handheld  instruments were placed within the knee joint and the patient underwent  medial meniscectomy at this time. Photographs were obtained of all structures before and after  debridement. The patient tolerated the procedure well. The knee was  irrigated out, injected with Marcaine, epinephrine, and morphine and the  wounds were closed with nylon. A dressing was applied, and the patient  was brought to recovery room in stable condition.         Hope Kohli MD    D: 06/19/2019 13:06:09       T: 06/19/2019 19:45:33     [email or web address]  Job#: [id number]     Doc#: [id number]    CC:

## 2019-06-21 NOTE — PROGRESS NOTES
Patient is 24-year-old female is here for preoperative discussion of right knee arthroscopy and debridement. This is to be performed on 5/23/2019. Patient has increasing pain and difficulty with ambulation. She is status post a left total knee arthroplasty performed 9 years ago by myself. As far as her right knee is concerned she has no history of injury or surgery. Patient does not smoke does not have diabetes and has no history of DVT. There is no metal allergies and the patient does not complain of any significant back pain. She has been treated conservatively with anti-inflammatories and cortisone injections which have not given her any significant relief. She underwent MRI on 5/9/2019 which showed medial compartment grade III chondromalacia and extensive changes and cartilage fissuring noted. There is also some degenerative type tearing of the medial meniscus noted. She is here for further discussion and preoperative evaluation for right knee arthroscopy and medial meniscectomy with debridement. Patient Active Problem List   Diagnosis    Primary localized osteoarthrosis, lower leg    Acute medial meniscus tear, right, initial encounter     Prior to Visit Medications    Medication Sig Taking? Authorizing Provider   sertraline (ZOLOFT) 100 MG tablet Take 300 mg by mouth daily  Yes Historical Provider, MD   oxyCODONE-acetaminophen (PERCOCET) 5-325 MG per tablet Take 1-2 tablets by mouth every 6 hours as needed for Pain for up to 7 days. Raj Redmond MD   naproxen (NAPROSYN) 500 MG tablet Take 1 tablet by mouth 2 times daily (with meals)  Raj Rdemond MD   acetaminophen (TYLENOL) 500 MG tablet Take 500 mg by mouth every 6 hours as needed for Pain  Historical Provider, MD     Physical examination 24-year-old female oriented x3 temperature is 98.4. Patient has high class III morbid obesity BMI of 48.92.   Examination of her back shows mild decreased range of motion but no specific pain tenderness or instability. Motor exam to the right lower extremity shows quadriceps hamstrings hip abductors and abductors foot plantar dorsiflexors are intact 4-5 over 5. Sensation and perfusion are intact to the right foot and ankle. There is no numbness or tingling noted in the right lower extremity. Deep tendon reflexes are 0 to need 0 at the ankle of the right lower extremity. No other obvious cutaneous lesions lymphedema or cellulitic processes are seen in the right lower extremity. Examination of the right knee shows a moderate effusion medial joint line tenderness to palpation with near full extension and her calf on thigh flexion. This patient is morbidly obese and her range of motion is restricted by her overall body habitus. There is no obvious signs of instability or deep sepsis noted in the right knee. X-rays in the past of shown early medial degenerative changes but no bone-on-bone findings. Impression 72-year-old morbidly obese female with degenerative meniscal tearing and early degenerative osteoarthritis. This patient will probably eventually need to undergo knee arthroplasty however in the interim as she is trying to lose weight and good control of her overall morbid obesity we discussed with her knee arthroscopy with debridement. We did tell her that more than likely she will eventually need to go onto some type of knee arthroplasty and it would be in her best interest to lose a significant amount of weight. We had a 35 minute face-to-face discussion of which greater than 50% of the time was spent in counseling with this patient concerning right knee diagnostic video arthroscopy with partial meniscectomy and debridement  it's preoperative evaluation and its postoperative pain management and follow-up.   We went over the surgical procedure risks and complications including bleeding, infection,  neurovascular injury, decreased range of motion, persistent pain, instability, DVT,

## 2019-06-26 ENCOUNTER — TELEPHONE (OUTPATIENT)
Dept: ORTHOPEDIC SURGERY | Age: 64
End: 2019-06-26

## 2019-06-27 ENCOUNTER — OFFICE VISIT (OUTPATIENT)
Dept: ORTHOPEDIC SURGERY | Age: 64
End: 2019-06-27

## 2019-06-27 VITALS — BODY MASS INDEX: 48.65 KG/M2 | WEIGHT: 285 LBS | HEIGHT: 64 IN

## 2019-06-27 DIAGNOSIS — S83.241A TEAR OF MEDIAL MENISCUS OF RIGHT KNEE, CURRENT, UNSPECIFIED TEAR TYPE, INITIAL ENCOUNTER: Primary | ICD-10-CM

## 2019-06-27 PROCEDURE — 99024 POSTOP FOLLOW-UP VISIT: CPT | Performed by: PHYSICIAN ASSISTANT

## 2019-06-27 NOTE — PROGRESS NOTES
This dictation was done with [employer] dictation and may contain mechanical errors related to translation. Height 5' 4\" (1.626 m), weight 285 lb (129.3 kg). This is a pleasant 59-year-old female who is here after a right knee arthroscopy on 6/19/2019. She is here couple days early as she was getting some calf pain and called the office yesterday. When she woke up today she had a lot less calf pain more function and almost canceled the appointment but she kept it to be evaluated. She does have ecchymosis and bruising her incisions are intact and the sutures are still present. However she has a negative Homans test negative Mccray test and 0 to 130 degrees of motion. We reviewed the signs and symptoms of blood clotting and she has what to do if she gets there is otherwise we will see her in follow-up on Monday for removal of the stitches.     I did take the time while she was here to get through the scope pictures with her and explain short and long-term expectations

## 2019-07-01 ENCOUNTER — OFFICE VISIT (OUTPATIENT)
Dept: ORTHOPEDIC SURGERY | Age: 64
End: 2019-07-01

## 2019-07-01 VITALS — RESPIRATION RATE: 16 BRPM | HEIGHT: 64 IN | TEMPERATURE: 97.1 F | WEIGHT: 285 LBS | BODY MASS INDEX: 48.65 KG/M2

## 2019-07-01 DIAGNOSIS — S83.241A ACUTE MEDIAL MENISCUS TEAR, RIGHT, INITIAL ENCOUNTER: ICD-10-CM

## 2019-07-01 PROCEDURE — 99024 POSTOP FOLLOW-UP VISIT: CPT | Performed by: PHYSICIAN ASSISTANT

## 2019-07-01 RX ORDER — OXYCODONE HYDROCHLORIDE AND ACETAMINOPHEN 5; 325 MG/1; MG/1
1-2 TABLET ORAL EVERY 6 HOURS PRN
Qty: 40 TABLET | Refills: 0 | Status: SHIPPED | OUTPATIENT
Start: 2019-07-01 | End: 2019-07-08

## 2019-08-05 ENCOUNTER — OFFICE VISIT (OUTPATIENT)
Dept: ORTHOPEDIC SURGERY | Age: 64
End: 2019-08-05

## 2019-08-05 VITALS — TEMPERATURE: 97.8 F | WEIGHT: 285 LBS | BODY MASS INDEX: 48.65 KG/M2 | HEIGHT: 64 IN

## 2019-08-05 DIAGNOSIS — S83.241A ACUTE MEDIAL MENISCUS TEAR, RIGHT, INITIAL ENCOUNTER: Primary | ICD-10-CM

## 2019-08-05 PROCEDURE — 99024 POSTOP FOLLOW-UP VISIT: CPT | Performed by: PHYSICIAN ASSISTANT

## 2019-10-07 ENCOUNTER — OFFICE VISIT (OUTPATIENT)
Dept: ORTHOPEDIC SURGERY | Age: 64
End: 2019-10-07
Payer: COMMERCIAL

## 2019-10-07 VITALS
SYSTOLIC BLOOD PRESSURE: 157 MMHG | DIASTOLIC BLOOD PRESSURE: 83 MMHG | BODY MASS INDEX: 50.02 KG/M2 | HEIGHT: 64 IN | TEMPERATURE: 98.6 F | HEART RATE: 67 BPM | WEIGHT: 293 LBS

## 2019-10-07 DIAGNOSIS — S83.241A ACUTE MEDIAL MENISCUS TEAR, RIGHT, INITIAL ENCOUNTER: Primary | ICD-10-CM

## 2019-10-07 DIAGNOSIS — S83.241A TEAR OF MEDIAL MENISCUS OF RIGHT KNEE, CURRENT, UNSPECIFIED TEAR TYPE, INITIAL ENCOUNTER: ICD-10-CM

## 2019-10-07 DIAGNOSIS — M17.11 PRIMARY OSTEOARTHRITIS OF RIGHT KNEE: ICD-10-CM

## 2019-10-07 PROCEDURE — G8484 FLU IMMUNIZE NO ADMIN: HCPCS | Performed by: ORTHOPAEDIC SURGERY

## 2019-10-07 PROCEDURE — G8417 CALC BMI ABV UP PARAM F/U: HCPCS | Performed by: ORTHOPAEDIC SURGERY

## 2019-10-07 PROCEDURE — 1036F TOBACCO NON-USER: CPT | Performed by: ORTHOPAEDIC SURGERY

## 2019-10-07 PROCEDURE — 99213 OFFICE O/P EST LOW 20 MIN: CPT | Performed by: ORTHOPAEDIC SURGERY

## 2019-10-07 PROCEDURE — 3017F COLORECTAL CA SCREEN DOC REV: CPT | Performed by: ORTHOPAEDIC SURGERY

## 2019-10-07 PROCEDURE — 20610 DRAIN/INJ JOINT/BURSA W/O US: CPT | Performed by: ORTHOPAEDIC SURGERY

## 2019-10-07 PROCEDURE — G8427 DOCREV CUR MEDS BY ELIG CLIN: HCPCS | Performed by: ORTHOPAEDIC SURGERY

## 2019-10-07 RX ORDER — IBUPROFEN 200 MG
200 TABLET ORAL EVERY 6 HOURS PRN
COMMUNITY

## 2020-05-28 ENCOUNTER — OFFICE VISIT (OUTPATIENT)
Dept: ORTHOPEDIC SURGERY | Age: 65
End: 2020-05-28
Payer: COMMERCIAL

## 2020-05-28 VITALS — HEIGHT: 64 IN | RESPIRATION RATE: 16 BRPM | BODY MASS INDEX: 50.02 KG/M2 | TEMPERATURE: 97.2 F | WEIGHT: 293 LBS

## 2020-05-28 PROCEDURE — 1036F TOBACCO NON-USER: CPT | Performed by: PHYSICIAN ASSISTANT

## 2020-05-28 PROCEDURE — 99213 OFFICE O/P EST LOW 20 MIN: CPT | Performed by: PHYSICIAN ASSISTANT

## 2020-05-28 PROCEDURE — 3017F COLORECTAL CA SCREEN DOC REV: CPT | Performed by: PHYSICIAN ASSISTANT

## 2020-05-28 PROCEDURE — G8417 CALC BMI ABV UP PARAM F/U: HCPCS | Performed by: PHYSICIAN ASSISTANT

## 2020-05-28 PROCEDURE — 20610 DRAIN/INJ JOINT/BURSA W/O US: CPT | Performed by: PHYSICIAN ASSISTANT

## 2020-05-28 PROCEDURE — G8428 CUR MEDS NOT DOCUMENT: HCPCS | Performed by: PHYSICIAN ASSISTANT

## 2020-06-01 NOTE — PROGRESS NOTES
understand that at some point, they will need a knee replacement. And they will follow up with us on a when necessary basis over the next 3-6 months    This patient has a well documented history of osteoarthritis in their knee. They have trialed Nsaid medications, physical therapy exercises and steroid injections. They continue to have pain, swelling and dysfunction. At this junction, hyalgen injections are advisable. I gave them literature and discussed the risks and benefits with them and would like to seek pre-authorization for Euflexxa.     Kenalog:  NDC: [id number]  Lot Number: [id number]  Expiration Date: 6/21

## 2020-06-11 ENCOUNTER — OFFICE VISIT (OUTPATIENT)
Dept: ORTHOPEDIC SURGERY | Age: 65
End: 2020-06-11
Payer: COMMERCIAL

## 2020-06-11 VITALS — BODY MASS INDEX: 50.02 KG/M2 | HEIGHT: 64 IN | WEIGHT: 293 LBS | TEMPERATURE: 97.7 F

## 2020-06-11 PROCEDURE — G8417 CALC BMI ABV UP PARAM F/U: HCPCS | Performed by: PHYSICIAN ASSISTANT

## 2020-06-11 PROCEDURE — 1036F TOBACCO NON-USER: CPT | Performed by: PHYSICIAN ASSISTANT

## 2020-06-11 PROCEDURE — 99213 OFFICE O/P EST LOW 20 MIN: CPT | Performed by: PHYSICIAN ASSISTANT

## 2020-06-11 PROCEDURE — 20610 DRAIN/INJ JOINT/BURSA W/O US: CPT | Performed by: PHYSICIAN ASSISTANT

## 2020-06-11 PROCEDURE — 3017F COLORECTAL CA SCREEN DOC REV: CPT | Performed by: PHYSICIAN ASSISTANT

## 2020-06-11 PROCEDURE — G8427 DOCREV CUR MEDS BY ELIG CLIN: HCPCS | Performed by: PHYSICIAN ASSISTANT

## 2020-06-11 NOTE — PROGRESS NOTES
This dictation was done with [employer] dictation and may contain mechanical errors related to translation. Temperature 97.7 °F (36.5 °C), temperature source Temporal, height 5' 4\" (1.626 m), weight 294 lb (133.4 kg). Subjective:  right knee pain. She is here for  1st Euflexxa  injection for the  right knee(s). Objective:   Temperature 97.7 °F (36.5 °C), temperature source Temporal, height 5' 4\" (1.626 m), weight 294 lb (133.4 kg). There is a milld joint effusion noted of the right knee(s). There is moderate pain with range of motion testing. There is no significant  instability noted. Neuro exam grossly intact both lower extremities. Intact sensation to light touch. Motor exam 4+ to 5/5 in all major motor groups. Negative Olmedo's sign. Skin is warm, dry and intact with out erythema or significant increased temperature around the knee joint(s). Assessment:  Degenerative Joint Disease of the right Knee(s). Plan:  Discussed  injections including all  risks and benefits including increased pain, drug reaction, infection, bleeding, lack of improvement and  neurovascular injury. All the questions were answered. PROCEDURE NOTE:  PRE-PROCEDURE DIAGNOSIS: DJD knee  POST-PROCEDURE DIAGNOSIS: DJD knee    With the patient's permission, her right knee was prepped in standard sterile fashion with  Alcohol. The prefilled injection of the preferred Eufflexxa was injected into the right lateral joint space compartment without difficulty. The patient tolerated the procedure(s) well without difficulty. A band-aid was applied. POST-PROCEDURE INSTRUCTIONS GIVEN TO PATIENT: The patient was advised to ice the knee for 15-20 minutes to relieve any injection site related pain. Decrease activity for the next 24 to 48 hours. May use prescription or OTC pain relievers as needed      FOLLOW-UP:   As directed or call or return to clinic if these symptoms worsen or fail to improve as anticipated.  If at any time you are concerned you may contact the office for further instructions or care.

## 2020-06-18 ENCOUNTER — OFFICE VISIT (OUTPATIENT)
Dept: ORTHOPEDIC SURGERY | Age: 65
End: 2020-06-18
Payer: COMMERCIAL

## 2020-06-18 PROCEDURE — 20610 DRAIN/INJ JOINT/BURSA W/O US: CPT | Performed by: PHYSICIAN ASSISTANT

## 2020-06-20 NOTE — PROGRESS NOTES
This dictation was done with [employer] dictation and may contain mechanical errors related to translation. There were no vitals taken for this visit. Subjective:  right knee pain. She is here for  2nd Euflexxa  injection for the  right knee(s). Objective: There were no vitals taken for this visit. There is a milld joint effusion noted of the right knee(s). There is moderate pain with range of motion testing. There is no significant  instability noted. Neuro exam grossly intact both lower extremities. Intact sensation to light touch. Motor exam 4+ to 5/5 in all major motor groups. Negative Olmedo's sign. Skin is warm, dry and intact with out erythema or significant increased temperature around the knee joint(s). Assessment:  Degenerative Joint Disease of the right Knee(s). Plan:  Discussed  injections including all  risks and benefits including increased pain, drug reaction, infection, bleeding, lack of improvement and  neurovascular injury. All the questions were answered. PROCEDURE NOTE:  PRE-PROCEDURE DIAGNOSIS: DJD knee  POST-PROCEDURE DIAGNOSIS: DJD knee    With the patient's permission, her right knee was prepped in standard sterile fashion with  Alcohol. The prefilled injection of the preferred Eufflexxa was injected into the right lateral joint space compartment without difficulty. The patient tolerated the procedure(s) well without difficulty. A band-aid was applied. POST-PROCEDURE INSTRUCTIONS GIVEN TO PATIENT: The patient was advised to ice the knee for 15-20 minutes to relieve any injection site related pain. Decrease activity for the next 24 to 48 hours. May use prescription or OTC pain relievers as needed      FOLLOW-UP:   As directed or call or return to clinic if these symptoms worsen or fail to improve as anticipated. If at any time you are concerned you may contact the office for further instructions or care.

## 2020-06-25 ENCOUNTER — OFFICE VISIT (OUTPATIENT)
Dept: ORTHOPEDIC SURGERY | Age: 65
End: 2020-06-25
Payer: COMMERCIAL

## 2020-06-25 VITALS — TEMPERATURE: 97.3 F | BODY MASS INDEX: 50.02 KG/M2 | WEIGHT: 293 LBS | HEIGHT: 64 IN

## 2020-06-25 PROCEDURE — 20610 DRAIN/INJ JOINT/BURSA W/O US: CPT | Performed by: PHYSICIAN ASSISTANT

## 2020-06-27 NOTE — PROGRESS NOTES
This dictation was done with [employer] dictation and may contain mechanical errors related to translation. Temperature 97.3 °F (36.3 °C), temperature source Temporal, height 5' 4\" (1.626 m), weight 294 lb (133.4 kg). Subjective:  right knee pain. She is here for  3rd Euflexxa  injection for the  right knee(s). Objective:   Temperature 97.3 °F (36.3 °C), temperature source Temporal, height 5' 4\" (1.626 m), weight 294 lb (133.4 kg). There is a milld joint effusion noted of the right knee(s). There is moderate pain with range of motion testing. There is no significant  instability noted. Neuro exam grossly intact both lower extremities. Intact sensation to light touch. Motor exam 4+ to 5/5 in all major motor groups. Negative Olmedo's sign. Skin is warm, dry and intact with out erythema or significant increased temperature around the knee joint(s). Assessment:  Degenerative Joint Disease of the right Knee(s). Plan:  Discussed  injections including all  risks and benefits including increased pain, drug reaction, infection, bleeding, lack of improvement and  neurovascular injury. All the questions were answered. PROCEDURE NOTE:  PRE-PROCEDURE DIAGNOSIS: DJD knee  POST-PROCEDURE DIAGNOSIS: DJD knee    With the patient's permission, her right knee was prepped in standard sterile fashion with  Alcohol. The prefilled injection of the preferred Eufflexxa was injected into the right lateral joint space compartment without difficulty. The patient tolerated the procedure(s) well without difficulty. A band-aid was applied. POST-PROCEDURE INSTRUCTIONS GIVEN TO PATIENT: The patient was advised to ice the knee for 15-20 minutes to relieve any injection site related pain. Decrease activity for the next 24 to 48 hours. May use prescription or OTC pain relievers as needed      FOLLOW-UP:   As directed or call or return to clinic if these symptoms worsen or fail to improve as anticipated.  If at any time you are concerned you may contact the office for further instructions or care.

## 2020-07-30 ENCOUNTER — OFFICE VISIT (OUTPATIENT)
Dept: ORTHOPEDIC SURGERY | Age: 65
End: 2020-07-30
Payer: COMMERCIAL

## 2020-07-30 VITALS — TEMPERATURE: 97.5 F

## 2020-07-30 PROCEDURE — G8428 CUR MEDS NOT DOCUMENT: HCPCS | Performed by: PHYSICIAN ASSISTANT

## 2020-07-30 PROCEDURE — 3017F COLORECTAL CA SCREEN DOC REV: CPT | Performed by: PHYSICIAN ASSISTANT

## 2020-07-30 PROCEDURE — 99213 OFFICE O/P EST LOW 20 MIN: CPT | Performed by: PHYSICIAN ASSISTANT

## 2020-07-30 PROCEDURE — G8417 CALC BMI ABV UP PARAM F/U: HCPCS | Performed by: PHYSICIAN ASSISTANT

## 2020-07-30 PROCEDURE — 20610 DRAIN/INJ JOINT/BURSA W/O US: CPT | Performed by: PHYSICIAN ASSISTANT

## 2020-07-30 PROCEDURE — 1036F TOBACCO NON-USER: CPT | Performed by: PHYSICIAN ASSISTANT

## 2020-08-03 NOTE — PROGRESS NOTES
This dictation was done with [employer] dictation and may contain mechanical errors related to translation. Temperature 97.5 °F (36.4 °C), temperature source Temporal.      This is a pleasant 37-year-old female who has had ongoing pain from osteoarthritis in her right knee. She had a cortisone shot on 5/20/2020 and then the Euflexxa injection series after that finishing in June. She just lost 17 pounds last month and has had overall good improvement with her knee. Currently she has some swelling and some mild soreness with flexion and extension around the patellofemoral joint. She is better than she was prior to the injections but this point additional cortisone I think will help her continue with stretching strengthening exercise program    This patient is here in follow-up for ongoing pain and dysfunction in their right knee. There x-rays done previously showed joint space narrowing subchondral sclerosis with osteophyte formation. They currently have had relief with injections of cortisone, anti-inflammatories and a home exercise program. There are here with increased pain over the last few days with swelling and dysfunction. On examination this is a well-developed patient in no acute distress. They are alert and oriented ×3. They walk without antalgia or any significant varus or valgus deformity. They have crepitus during flexion and extension in the patellofemoral joint. They have a negative Lachman and a negative Yi. There are good distal pulses and good dorsiflexion and plantarflexion strength present    My impression is right knee osteoarthritis    After a discussion of the multiple options, they consented to a cortisone shot. 1 ml of 40mg/ml Kenalog and 2 ml's of 0.25%Marcaine were injected into the right knee joint space. The leg was slightly flexed and injected just lateral to the patella tendon to under the patella. This was done with sterile technique and they tolerated it well.     I went through a good stretch and strengthening exercise program. They understand that at some point, they will need a knee replacement.  And they will follow up with us on a when necessary basis over the next 3-6 months

## 2021-01-15 ENCOUNTER — TELEPHONE (OUTPATIENT)
Dept: ORTHOPEDIC SURGERY | Age: 66
End: 2021-01-15

## 2021-01-15 NOTE — TELEPHONE ENCOUNTER
General Question     Subject: GEL INJECTIONS  Patient and /or Facility Request: PATIENT READY FOR EUFLEXXA INJECTIONS. AUTHORIZED?   Contact Number: [phone]

## 2021-01-28 ENCOUNTER — OFFICE VISIT (OUTPATIENT)
Dept: ORTHOPEDIC SURGERY | Age: 66
End: 2021-01-28
Payer: COMMERCIAL

## 2021-01-28 VITALS — HEIGHT: 64 IN | TEMPERATURE: 97.2 F | BODY MASS INDEX: 46.1 KG/M2 | WEIGHT: 270 LBS

## 2021-01-28 DIAGNOSIS — M17.11 PRIMARY OSTEOARTHRITIS OF RIGHT KNEE: Primary | ICD-10-CM

## 2021-01-28 PROCEDURE — 3017F COLORECTAL CA SCREEN DOC REV: CPT | Performed by: PHYSICIAN ASSISTANT

## 2021-01-28 PROCEDURE — G8417 CALC BMI ABV UP PARAM F/U: HCPCS | Performed by: PHYSICIAN ASSISTANT

## 2021-01-28 PROCEDURE — 99213 OFFICE O/P EST LOW 20 MIN: CPT | Performed by: PHYSICIAN ASSISTANT

## 2021-01-28 PROCEDURE — G8484 FLU IMMUNIZE NO ADMIN: HCPCS | Performed by: PHYSICIAN ASSISTANT

## 2021-01-28 PROCEDURE — G8427 DOCREV CUR MEDS BY ELIG CLIN: HCPCS | Performed by: PHYSICIAN ASSISTANT

## 2021-01-28 PROCEDURE — 1036F TOBACCO NON-USER: CPT | Performed by: PHYSICIAN ASSISTANT

## 2021-01-28 PROCEDURE — 20610 DRAIN/INJ JOINT/BURSA W/O US: CPT | Performed by: PHYSICIAN ASSISTANT

## 2021-01-29 NOTE — PROGRESS NOTES
follow up with us on a when necessary basis over the next 3-6 months  I think she is a good candidate for the repeating of the Euflexxa    This patient has a well documented history of osteoarthritis in their knee. They have trialed Nsaid medications, physical therapy exercises and steroid injections. They continue to have pain, swelling and dysfunction. At this junction, hyalgen injections are advisable.  I gave them literature and discussed the risks and benefits with them and would like to seek pre-authorization for

## 2021-02-03 ENCOUNTER — TELEPHONE (OUTPATIENT)
Dept: ORTHOPEDIC SURGERY | Age: 66
End: 2021-02-03

## 2021-02-03 NOTE — TELEPHONE ENCOUNTER
I called and advised patient that she may schedule. She will call back next week when she knows her schedule.

## 2021-02-03 NOTE — TELEPHONE ENCOUNTER
02/03/2021  EUFLEXXA  (SERIES OF 3)   RIGHT  KNEE. APPROVED #  [id number]. DATES:  02/01/2021 - 08/01/2021. PER FAX FROM [facility].   AP

## 2021-02-09 ENCOUNTER — TELEPHONE (OUTPATIENT)
Dept: ORTHOPEDIC SURGERY | Age: 66
End: 2021-02-09

## 2021-03-11 ENCOUNTER — NURSE ONLY (OUTPATIENT)
Dept: ORTHOPEDIC SURGERY | Age: 66
End: 2021-03-11
Payer: COMMERCIAL

## 2021-03-11 VITALS — DIASTOLIC BLOOD PRESSURE: 79 MMHG | SYSTOLIC BLOOD PRESSURE: 156 MMHG | TEMPERATURE: 96.9 F | HEART RATE: 72 BPM

## 2021-03-11 DIAGNOSIS — M17.11 PRIMARY OSTEOARTHRITIS OF RIGHT KNEE: Primary | ICD-10-CM

## 2021-03-11 PROCEDURE — 20610 DRAIN/INJ JOINT/BURSA W/O US: CPT | Performed by: PHYSICIAN ASSISTANT

## 2021-03-13 NOTE — PROGRESS NOTES
Euflexxa:  NDC: [address]-85  Lot #: [id number]  Expiration Date: 69.47.2594
directed or call or return to clinic if these symptoms worsen or fail to improve as anticipated. If at any time you are concerned you may contact the office for further instructions or care.

## 2021-03-18 ENCOUNTER — NURSE ONLY (OUTPATIENT)
Dept: ORTHOPEDIC SURGERY | Age: 66
End: 2021-03-18
Payer: COMMERCIAL

## 2021-03-18 VITALS — TEMPERATURE: 97.3 F

## 2021-03-18 DIAGNOSIS — M17.11 PRIMARY OSTEOARTHRITIS OF RIGHT KNEE: Primary | ICD-10-CM

## 2021-03-18 PROCEDURE — 20610 DRAIN/INJ JOINT/BURSA W/O US: CPT | Performed by: PHYSICIAN ASSISTANT

## 2021-03-19 NOTE — PROGRESS NOTES
This dictation was done with [employer] dictation and may contain mechanical errors related to translation. Temperature 97.3 °F (36.3 °C), temperature source Temporal.        Subjective:  right knee pain. She is here for  2nd Euflexxa  injection for the  right knee(s). Objective:   Temperature 97.3 °F (36.3 °C), temperature source Temporal.    There is a milld joint effusion noted of the right knee(s). There is moderate pain with range of motion testing. There is no significant  instability noted. Neuro exam grossly intact both lower extremities. Intact sensation to light touch. Motor exam 4+ to 5/5 in all major motor groups. Negative Olmedo's sign. Skin is warm, dry and intact with out erythema or significant increased temperature around the knee joint(s). Assessment:  Degenerative Joint Disease of the right Knee(s). Plan:  Discussed  injections including all  risks and benefits including increased pain, drug reaction, infection, bleeding, lack of improvement and  neurovascular injury. All the questions were answered. PROCEDURE NOTE:  PRE-PROCEDURE DIAGNOSIS: DJD knee  POST-PROCEDURE DIAGNOSIS: DJD knee    With the patient's permission, her right knee was prepped in standard sterile fashion with  Alcohol. The prefilled injection of the preferred Eufflexxa was injected into the right lateral joint space compartment without difficulty. The patient tolerated the procedure(s) well without difficulty. A band-aid was applied. POST-PROCEDURE INSTRUCTIONS GIVEN TO PATIENT: The patient was advised to ice the knee for 15-20 minutes to relieve any injection site related pain. Decrease activity for the next 24 to 48 hours. May use prescription or OTC pain relievers as needed      FOLLOW-UP:   As directed or call or return to clinic if these symptoms worsen or fail to improve as anticipated. If at any time you are concerned you may contact the office for further instructions or care.

## 2021-04-01 ENCOUNTER — NURSE ONLY (OUTPATIENT)
Dept: ORTHOPEDIC SURGERY | Age: 66
End: 2021-04-01
Payer: COMMERCIAL

## 2021-04-01 VITALS — HEIGHT: 64 IN | BODY MASS INDEX: 46.1 KG/M2 | TEMPERATURE: 97.7 F | WEIGHT: 270 LBS

## 2021-04-01 DIAGNOSIS — S62.316A DISPLACED FRACTURE OF BASE OF FIFTH METACARPAL BONE, RIGHT HAND, INITIAL ENCOUNTER FOR CLOSED FRACTURE: ICD-10-CM

## 2021-04-01 DIAGNOSIS — M17.11 PRIMARY OSTEOARTHRITIS OF RIGHT KNEE: Primary | ICD-10-CM

## 2021-04-01 PROCEDURE — 20610 DRAIN/INJ JOINT/BURSA W/O US: CPT | Performed by: PHYSICIAN ASSISTANT

## 2021-04-01 RX ORDER — HYDROCODONE BITARTRATE AND ACETAMINOPHEN 5; 325 MG/1; MG/1
1 TABLET ORAL EVERY 6 HOURS PRN
Qty: 20 TABLET | Refills: 0 | Status: SHIPPED | OUTPATIENT
Start: 2021-04-01 | End: 2021-04-01

## 2021-04-01 NOTE — LETTER
[facility]  [place] [address] [address] [address]  Phone: [phone]  Fax: [phone]    Sherly Kulkarni, [address]        April 1, 2021     Patient: Ayanna Gray   YOB: 1955   Date of Visit: 4/1/2021       To Whom It May Concern: It is my medical opinion that Damon Colby may return to work on 4/5/2021. If you have any questions or concerns, please don't hesitate to call.     Sincerely,          KRISSY Aldana

## 2021-04-03 NOTE — PROGRESS NOTES
Euflexxa:  NDC: [address]-58  Lot #: [id number]  Expiration Date: 11.29.2021    Right knee
without difficulty. A band-aid was applied. POST-PROCEDURE INSTRUCTIONS GIVEN TO PATIENT: The patient was advised to ice the knee for 15-20 minutes to relieve any injection site related pain. Decrease activity for the next 24 to 48 hours. May use prescription or OTC pain relievers as needed      FOLLOW-UP:   As directed or call or return to clinic if these symptoms worsen or fail to improve as anticipated. If at any time you are concerned you may contact the office for further instructions or care.

## 2022-04-21 ENCOUNTER — OFFICE VISIT (OUTPATIENT)
Dept: ORTHOPEDIC SURGERY | Age: 67
End: 2022-04-21
Payer: COMMERCIAL

## 2022-04-21 VITALS — BODY MASS INDEX: 46.1 KG/M2 | HEIGHT: 64 IN | RESPIRATION RATE: 16 BRPM | WEIGHT: 270 LBS

## 2022-04-21 DIAGNOSIS — M17.11 PRIMARY OSTEOARTHRITIS OF RIGHT KNEE: Primary | ICD-10-CM

## 2022-04-21 PROCEDURE — G8417 CALC BMI ABV UP PARAM F/U: HCPCS | Performed by: PHYSICIAN ASSISTANT

## 2022-04-21 PROCEDURE — 1036F TOBACCO NON-USER: CPT | Performed by: PHYSICIAN ASSISTANT

## 2022-04-21 PROCEDURE — 20610 DRAIN/INJ JOINT/BURSA W/O US: CPT | Performed by: PHYSICIAN ASSISTANT

## 2022-04-21 PROCEDURE — 3017F COLORECTAL CA SCREEN DOC REV: CPT | Performed by: PHYSICIAN ASSISTANT

## 2022-04-21 PROCEDURE — G8427 DOCREV CUR MEDS BY ELIG CLIN: HCPCS | Performed by: PHYSICIAN ASSISTANT

## 2022-04-21 PROCEDURE — 99213 OFFICE O/P EST LOW 20 MIN: CPT | Performed by: PHYSICIAN ASSISTANT

## 2022-04-21 RX ORDER — TRIAMCINOLONE ACETONIDE 40 MG/ML
40 INJECTION, SUSPENSION INTRA-ARTICULAR; INTRAMUSCULAR ONCE
Status: COMPLETED | OUTPATIENT
Start: 2022-04-21 | End: 2022-04-21

## 2022-04-21 RX ORDER — BUPIVACAINE HYDROCHLORIDE 2.5 MG/ML
2 INJECTION, SOLUTION INFILTRATION; PERINEURAL ONCE
Status: COMPLETED | OUTPATIENT
Start: 2022-04-21 | End: 2022-04-21

## 2022-04-21 RX ADMIN — BUPIVACAINE HYDROCHLORIDE 5 MG: 2.5 INJECTION, SOLUTION INFILTRATION; PERINEURAL at 14:41

## 2022-04-21 RX ADMIN — TRIAMCINOLONE ACETONIDE 40 MG: 40 INJECTION, SUSPENSION INTRA-ARTICULAR; INTRAMUSCULAR at 14:41

## 2022-04-22 NOTE — PROGRESS NOTES
This dictation was done with [employer] dictation and may contain mechanical errors related to translation. Resp. rate 16, height 5' 4\" (1.626 m), weight 270 lb (122.5 kg). This is a very pleasant 72-year-old female has ongoing pain in her right knee from osteoarthritis. She had a lot of relief with cortisone and Euflexxa injection done last March 2021. Is only been the last few weeks that the swelling and soreness has returned. This patient is here in follow-up for ongoing pain and dysfunction in their right knee. There x-rays done previously showed joint space narrowing subchondral sclerosis with osteophyte formation. They currently have had relief with injections of cortisone, anti-inflammatories and a home exercise program. There are here with increased pain over the last few days with swelling and dysfunction. On examination this is a well-developed patient in no acute distress. They are alert and oriented ×3. They walk without antalgia or any significant varus or valgus deformity. They have crepitus during flexion and extension in the patellofemoral joint. They have a negative Lachman and a negative Yi. There are good distal pulses and good dorsiflexion and plantarflexion strength present    My impression is right knee osteoarthritis    After a discussion of the multiple options, they consented to a cortisone shot. 1 ml of 40mg/ml Kenalog and 2 ml's of 0.25%Marcaine were injected into the right knee joint space. The leg was slightly flexed and injected just lateral to the patella tendon to under the patella. This was done with sterile technique and they tolerated it well. During today's visit, there was approximately 20 minutes of face-to-face discussion in regards to the patient's current condition and treatment options. More than 50 % of the time was counseling and coordination of care.       I went through a good stretch and strengthening exercise program. They understand that at some point, they will need a knee replacement. And they will follow up with us on a when necessary basis over the next 3-6 months    She also wants to do the Euflexxa injection series which I think would be beneficial. This patient has a well documented history of osteoarthritis in their knee. They have trialed Nsaid medications, physical therapy exercises and steroid injections. They continue to have pain, swelling and dysfunction. At this junction, hyalgen injections are advisable.  I gave them literature and discussed the risks and benefits with them and would like to seek pre-authorization for

## 2022-05-09 ENCOUNTER — TELEPHONE (OUTPATIENT)
Dept: ORTHOPEDIC SURGERY | Age: 67
End: 2022-05-09

## 2022-05-09 NOTE — TELEPHONE ENCOUNTER
[facility]  5/4/2022. Christoph Capellan () series of 3. APPROVED # [id number].  5/3/2022-5/3/2023.  RIGHT KNEE.  VALID & BILLABLE ON CLAIM YES. BUY AND BILL.  PER FAX FROM [facility].      I talked to the patient and she is going to call back tomorrow to schedule appointments

## 2022-06-02 ENCOUNTER — OFFICE VISIT (OUTPATIENT)
Dept: ORTHOPEDIC SURGERY | Age: 67
End: 2022-06-02
Payer: COMMERCIAL

## 2022-06-02 VITALS — HEIGHT: 64 IN | BODY MASS INDEX: 46.1 KG/M2 | RESPIRATION RATE: 16 BRPM | WEIGHT: 270 LBS

## 2022-06-02 DIAGNOSIS — M17.11 PRIMARY OSTEOARTHRITIS OF RIGHT KNEE: Primary | ICD-10-CM

## 2022-06-02 PROCEDURE — 20610 DRAIN/INJ JOINT/BURSA W/O US: CPT | Performed by: PHYSICIAN ASSISTANT

## 2022-06-02 RX ORDER — HYALURONATE SODIUM 10 MG/ML
20 SYRINGE (ML) INTRAARTICULAR ONCE
Status: COMPLETED | OUTPATIENT
Start: 2022-06-02 | End: 2022-06-02

## 2022-06-02 RX ADMIN — Medication 20 MG: at 14:21

## 2022-06-02 NOTE — PROGRESS NOTES
This dictation was done with [employer] dictation and may contain mechanical errors related to translation. Resp. rate 16, height 5' 4\" (1.626 m), weight 270 lb (122.5 kg). This is a pleasant 29-year-old female who has ongoing pain in her right knee from osteoarthritis. She states it is a 3 out of 10 pain today. She is got preapproval for Euflexxa injections. Subjective:  right knee pain. She is here for the osteoarthritis in the knee. After discussion examination we decided to proceed with the 1st Euflexxa  injection for the  right knee(s). Objective:   Resp. rate 16, height 5' 4\" (1.626 m), weight 270 lb (122.5 kg). There is a milld joint effusion noted of the right knee(s). There is moderate pain with range of motion testing. There is no significant  instability noted. Neuro exam grossly intact both lower extremities. Intact sensation to light touch. Motor exam 4+ to 5/5 in all major motor groups. Negative Olmedo's sign. Skin is warm, dry and intact with out erythema or significant increased temperature around the knee joint(s). Assessment:  Degenerative Joint Disease of the right Knee(s). Plan:  Discussed  injections including all  risks and benefits including increased pain, drug reaction, infection, bleeding, lack of improvement and  neurovascular injury. All the questions were answered. PROCEDURE NOTE:  PRE-PROCEDURE DIAGNOSIS: DJD knee  POST-PROCEDURE DIAGNOSIS: DJD knee    With the patient's permission, her right knee was prepped in standard sterile fashion with  Alcohol. The prefilled injection of the preferred Eufflexxa was injected into the right lateral joint space compartment without difficulty. The patient tolerated the procedure(s) well without difficulty. A band-aid was applied. POST-PROCEDURE INSTRUCTIONS GIVEN TO PATIENT: The patient was advised to ice the knee for 15-20 minutes to relieve any injection site related pain.  Decrease activity for the next 24 to 48 hours. May use prescription or OTC pain relievers as needed      FOLLOW-UP:   As directed or call or return to clinic if these symptoms worsen or fail to improve as anticipated. If at any time you are concerned you may contact the office for further instructions or care.

## 2022-06-09 ENCOUNTER — OFFICE VISIT (OUTPATIENT)
Dept: ORTHOPEDIC SURGERY | Age: 67
End: 2022-06-09
Payer: COMMERCIAL

## 2022-06-09 VITALS — HEIGHT: 64 IN | BODY MASS INDEX: 46.1 KG/M2 | WEIGHT: 270 LBS

## 2022-06-09 DIAGNOSIS — M17.11 PRIMARY OSTEOARTHRITIS OF RIGHT KNEE: Primary | ICD-10-CM

## 2022-06-09 PROCEDURE — 20610 DRAIN/INJ JOINT/BURSA W/O US: CPT | Performed by: PHYSICIAN ASSISTANT

## 2022-06-09 PROCEDURE — 99999 PR OFFICE/OUTPT VISIT,PROCEDURE ONLY: CPT | Performed by: PHYSICIAN ASSISTANT

## 2022-06-09 RX ORDER — HYALURONATE SODIUM 10 MG/ML
20 SYRINGE (ML) INTRAARTICULAR ONCE
Status: COMPLETED | OUTPATIENT
Start: 2022-06-09 | End: 2022-06-09

## 2022-06-09 RX ADMIN — Medication 20 MG: at 14:39

## 2022-06-09 NOTE — PROGRESS NOTES
This dictation was done with [employer] dictation and may contain mechanical errors related to translation. Height 5' 4\" (1.626 m), weight 270 lb (122.5 kg). Subjective:  right knee pain. She is here for the osteoarthritis in the knee. After discussion examination we decided to proceed with the 2nd Euflexxa  injection for the  right knee(s). Objective:   Height 5' 4\" (1.626 m), weight 270 lb (122.5 kg). There is a milld joint effusion noted of the right knee(s). There is moderate pain with range of motion testing. There is no significant  instability noted. Neuro exam grossly intact both lower extremities. Intact sensation to light touch. Motor exam 4+ to 5/5 in all major motor groups. Negative Olmedo's sign. Skin is warm, dry and intact with out erythema or significant increased temperature around the knee joint(s). Assessment:  Degenerative Joint Disease of the right Knee(s). Plan:  Discussed  injections including all  risks and benefits including increased pain, drug reaction, infection, bleeding, lack of improvement and  neurovascular injury. All the questions were answered. PROCEDURE NOTE:  PRE-PROCEDURE DIAGNOSIS: DJD knee  POST-PROCEDURE DIAGNOSIS: DJD knee    With the patient's permission, her right knee was prepped in standard sterile fashion with  Alcohol. The prefilled injection of the preferred Eufflexxa was injected into the right lateral joint space compartment without difficulty. The patient tolerated the procedure(s) well without difficulty. A band-aid was applied. POST-PROCEDURE INSTRUCTIONS GIVEN TO PATIENT: The patient was advised to ice the knee for 15-20 minutes to relieve any injection site related pain. Decrease activity for the next 24 to 48 hours. May use prescription or OTC pain relievers as needed      FOLLOW-UP:   As directed or call or return to clinic if these symptoms worsen or fail to improve as anticipated.  If at any time you are concerned you may contact the office for further instructions or care.

## 2022-06-16 ENCOUNTER — OFFICE VISIT (OUTPATIENT)
Dept: ORTHOPEDIC SURGERY | Age: 67
End: 2022-06-16
Payer: COMMERCIAL

## 2022-06-16 VITALS — WEIGHT: 270 LBS | BODY MASS INDEX: 46.1 KG/M2 | RESPIRATION RATE: 18 BRPM | HEIGHT: 64 IN

## 2022-06-16 DIAGNOSIS — M17.11 PRIMARY OSTEOARTHRITIS OF RIGHT KNEE: Primary | ICD-10-CM

## 2022-06-16 PROCEDURE — 99999 PR OFFICE/OUTPT VISIT,PROCEDURE ONLY: CPT | Performed by: PHYSICIAN ASSISTANT

## 2022-06-16 PROCEDURE — 20610 DRAIN/INJ JOINT/BURSA W/O US: CPT | Performed by: PHYSICIAN ASSISTANT

## 2022-06-16 RX ORDER — HYALURONATE SODIUM 10 MG/ML
20 SYRINGE (ML) INTRAARTICULAR ONCE
Status: COMPLETED | OUTPATIENT
Start: 2022-06-16 | End: 2022-06-16

## 2022-06-16 RX ADMIN — Medication 20 MG: at 14:16

## 2022-06-16 NOTE — PROGRESS NOTES
Subjective:    She  is here for visco supplementation, Euflexxa injection # 3 for the right knee. Objective:   Resp. rate 18, height 5' 4\" (1.626 m), weight 270 lb (122.5 kg). There is no obvious deformity. There is minimal joint effusion. There is mild pain with range of motion testing. There is no significant  instability noted. Assessment:    ICD-10-CM    1. Primary osteoarthritis of right knee  M17.11 SC ARTHROCENTESIS ASPIR&/INJ MAJOR JT/BURSA W/O US     sodium hyaluronate (EUFLEXXA, HYALGAN) injection 20 mg       Plan:    Risk and benefits of viscosupplementation injections were discussed today. Risks include but not limited to increased pain, bleeding, infection, failure to provide improvement, neurovascular injury. Proceed with injection # 3 in the series of 3 injections for the right knee. PROCEDURE NOTE:  PRE-PROCEDURE DIAGNOSIS: DJD knee  POST-PROCEDURE DIAGNOSIS: DJD knee  With the Issac Al permission, her right knee was prepped in standard sterile fashion with  Alcohol. The prefilled injection of the visco supplementation ( Euflexxa 20 mg/ 2 ML ) was injected into the  anterior-lateral joint space compartment without difficulty. she tolerated the procedure well without difficulty. A band-aid was applied. POST-PROCEDURE INSTRUCTIONS GIVEN TO PATIENT:   She was advised to ice the knee for 15-20 minutes to relieve any injection site related pain. Decrease activity for the next 24 to 48 hours. May use prescription or OTC pain relievers as needed    FOLLOW-UP: 6 weeks. As directed or call or return to clinic if these symptoms worsen or fail to improve as anticipated. If at any time you are concerned you may contact the office for further instructions or care.

## 2023-05-04 ENCOUNTER — OFFICE VISIT (OUTPATIENT)
Dept: ORTHOPEDIC SURGERY | Age: 68
End: 2023-05-04

## 2023-05-04 VITALS — WEIGHT: 270 LBS | RESPIRATION RATE: 16 BRPM | HEIGHT: 64 IN | BODY MASS INDEX: 46.1 KG/M2

## 2023-05-04 DIAGNOSIS — M17.11 PRIMARY OSTEOARTHRITIS OF RIGHT KNEE: Primary | ICD-10-CM

## 2023-05-04 RX ORDER — TRIAMCINOLONE ACETONIDE 40 MG/ML
40 INJECTION, SUSPENSION INTRA-ARTICULAR; INTRAMUSCULAR ONCE
Status: COMPLETED | OUTPATIENT
Start: 2023-05-04 | End: 2023-05-04

## 2023-05-04 RX ORDER — BUPIVACAINE HYDROCHLORIDE 2.5 MG/ML
2 INJECTION, SOLUTION INFILTRATION; PERINEURAL ONCE
Status: COMPLETED | OUTPATIENT
Start: 2023-05-04 | End: 2023-05-04

## 2023-05-04 RX ADMIN — TRIAMCINOLONE ACETONIDE 40 MG: 40 INJECTION, SUSPENSION INTRA-ARTICULAR; INTRAMUSCULAR at 14:52

## 2023-05-04 RX ADMIN — BUPIVACAINE HYDROCHLORIDE 5 MG: 2.5 INJECTION, SOLUTION INFILTRATION; PERINEURAL at 14:52

## 2023-05-10 NOTE — PROGRESS NOTES
This dictation was done with "[employer]on dictation and may contain mechanical errors related to translation. I have today reviewed with Scot Stern the clinically relevant, past medical history, medications, allergies, family history, social history, and Review Of Systems form the patients most recent history form & I have documented any details relevant to today's presenting complaints in my history below. Ms. Hai Groves self-reported past medical history, medications, allergies, family history, social history, and Review [employer] Systems form has been scanned into the chart under the \"Media\" tab. Subjective:  Scot Stern is a 74 y.o. who is here complain of pain in her right knee. She has been treated regularly with cortisone injections and most recently Euflexxa injection series last June. This did provide pretty significant relief at that time. At today's visit she has been 1+ edema some crepitus she states that throughout and pain. She was on for x-rays including a bilateral AP standing bilateral tunnel view lateral and sunrise view of the right knee. She has had her left knee replaced and then is doing very well. Patient Active Problem List   Diagnosis    Primary localized osteoarthrosis, lower leg    Acute medial meniscus tear, right, initial encounter           Current Outpatient Medications on File Prior to Visit   Medication Sig Dispense Refill    ibuprofen (ADVIL;MOTRIN) 200 MG tablet Take 200 mg by mouth every 6 hours as needed for Pain 4 tablets daily      sertraline (ZOLOFT) 100 MG tablet Take 300 mg by mouth daily        No current facility-administered medications on file prior to visit. Objective:   Resp. rate 16, height 5' 4\" (1.626 m), weight 270 lb (122.5 kg). On examination is a pleasant 74 old female who is alert and oriented x3 she walks with an antalgic does not Glycophos tension has 1+ edema.   She has palpable tenderness over the medial joint line and through

## 2023-05-15 ENCOUNTER — TELEPHONE (OUTPATIENT)
Dept: ORTHOPEDIC SURGERY | Age: 68
End: 2023-05-15

## 2023-05-15 NOTE — TELEPHONE ENCOUNTER
Patient notified that her gel injections were approved. [facility]

## 2023-05-23 ENCOUNTER — TELEPHONE (OUTPATIENT)
Dept: ORTHOPEDIC SURGERY | Age: 68
End: 2023-05-23

## 2023-05-23 NOTE — TELEPHONE ENCOUNTER
General Question     Subject: GEL INJECTIONS  Patient and /or Facility Request: Wili Andrews  Contact Number: [phone]      THE PT IS WANTING TO KNOW IF THE GEL INJECTIONS HAVE BEEN APPROVED AND IF THEY CAN BE SCHEDULED YET.  SHE DIDN'T KNOW WHAT THE NAME OF THE GEL INJECTION WAS. I DIDN'T SEE ANYTHING UNDER THE REFERRAL TAB.

## 2023-06-01 ENCOUNTER — OFFICE VISIT (OUTPATIENT)
Dept: ORTHOPEDIC SURGERY | Age: 68
End: 2023-06-01

## 2023-06-01 VITALS — BODY MASS INDEX: 46.1 KG/M2 | RESPIRATION RATE: 18 BRPM | HEIGHT: 64 IN | WEIGHT: 270 LBS

## 2023-06-01 DIAGNOSIS — M17.11 PRIMARY OSTEOARTHRITIS OF RIGHT KNEE: Primary | ICD-10-CM

## 2023-06-01 RX ORDER — HYALURONATE SODIUM 10 MG/ML
20 SYRINGE (ML) INTRAARTICULAR ONCE
Status: COMPLETED | OUTPATIENT
Start: 2023-06-01 | End: 2023-06-01

## 2023-06-01 RX ADMIN — Medication 20 MG: at 15:25

## 2023-06-03 NOTE — PROGRESS NOTES
This dictation was done with [employer] dictation and may contain mechanical errors related to translation. Resp. rate 18, height 5' 4\" (1.626 m), weight 270 lb (122.5 kg). Subjective:  right knee pain. She is here for the osteoarthritis in the knee. After discussion examination we decided to proceed with the 1st Euflexxa  injection for the  right knee(s). Objective:   Resp. rate 18, height 5' 4\" (1.626 m), weight 270 lb (122.5 kg). There is a milld joint effusion noted of the right knee(s). There is moderate pain with range of motion testing. There is no significant  instability noted. Neuro exam grossly intact both lower extremities. Intact sensation to light touch. Motor exam 4+ to 5/5 in all major motor groups. Negative Olmedo's sign. Skin is warm, dry and intact with out erythema or significant increased temperature around the knee joint(s). Assessment:  Degenerative Joint Disease of the right Knee(s). Plan:  Discussed  injections including all  risks and benefits including increased pain, drug reaction, infection, bleeding, lack of improvement and  neurovascular injury. All the questions were answered. PROCEDURE NOTE:  PRE-PROCEDURE DIAGNOSIS: DJD knee  POST-PROCEDURE DIAGNOSIS: DJD knee    With the patient's permission, her right knee was prepped in standard sterile fashion with  Alcohol. The prefilled injection of the preferred Eufflexxa was injected into the right lateral joint space compartment without difficulty. The patient tolerated the procedure(s) well without difficulty. A band-aid was applied. POST-PROCEDURE INSTRUCTIONS GIVEN TO PATIENT: The patient was advised to ice the knee for 15-20 minutes to relieve any injection site related pain. Decrease activity for the next 24 to 48 hours. May use prescription or OTC pain relievers as needed      FOLLOW-UP:   As directed or call or return to clinic if these symptoms worsen or fail to improve as anticipated.  If at any

## 2023-06-08 ENCOUNTER — OFFICE VISIT (OUTPATIENT)
Dept: ORTHOPEDIC SURGERY | Age: 68
End: 2023-06-08

## 2023-06-08 VITALS — RESPIRATION RATE: 16 BRPM | WEIGHT: 270 LBS | HEIGHT: 64 IN | BODY MASS INDEX: 46.1 KG/M2

## 2023-06-08 DIAGNOSIS — M17.11 PRIMARY OSTEOARTHRITIS OF RIGHT KNEE: Primary | ICD-10-CM

## 2023-06-08 RX ORDER — HYALURONATE SODIUM 10 MG/ML
20 SYRINGE (ML) INTRAARTICULAR ONCE
Status: COMPLETED | OUTPATIENT
Start: 2023-06-08 | End: 2023-06-08

## 2023-06-08 RX ADMIN — Medication 20 MG: at 16:10

## (undated) DEVICE — Z INACTIVE USE 2660664 SOLUTION IRRIG 3000ML 0.9% SOD CHL USP UROMATIC PLAS CONT

## (undated) DEVICE — GOWN SIRUS NONREIN XL W/TWL: Brand: MEDLINE INDUSTRIES, INC.

## (undated) DEVICE — MATTRESS MAXI AIR TRNSF SGL PT USE DCS 37 45 48 52 75

## (undated) DEVICE — BANDAGE COMPR W6INXL4.5YD LTWT E EC SGL LAYERED CLP CLSR

## (undated) DEVICE — [TOMCAT CUTTER, ARTHROSCOPIC SHAVER BLADE,  DO NOT RESTERILIZE,  DO NOT USE IF PACKAGE IS DAMAGED,  KEEP DRY,  KEEP AWAY FROM SUNLIGHT]: Brand: FORMULA

## (undated) DEVICE — ZIMMER® STERILE DISPOSABLE TOURNIQUET CUFF WITH PLC, DUAL PORT, SINGLE BLADDER, 42 IN. (107 CM)

## (undated) DEVICE — GLOVE,SURG,SENSICARE SLT,LF,PF,8.5: Brand: MEDLINE

## (undated) DEVICE — GOWN SIRUS NONREIN LG W/TWL: Brand: MEDLINE INDUSTRIES, INC.

## (undated) DEVICE — KIT OR ROOM TURNOVER W/STRAP

## (undated) DEVICE — Device

## (undated) DEVICE — NEEDLE HYPO 23GA L1.5IN TURQ POLYPR HUB S STL THN WALL IM

## (undated) DEVICE — SYRINGE MED 10ML LUERLOCK TIP W/O SFTY DISP

## (undated) DEVICE — 4-PORT MANIFOLD: Brand: NEPTUNE 2

## (undated) DEVICE — SUTURE ETHLN SZ 3-0 L18IN NONABSORBABLE BLK FS-1 L24MM 3/8 663H

## (undated) DEVICE — GLOVE,SURG,SENSICARE SLT,LF,PF,8: Brand: MEDLINE

## (undated) DEVICE — CHLORAPREP 26ML ORANGE

## (undated) DEVICE — [ARTHROSCOPY PUMP,  DO NOT USE IF PACKAGE IS DAMAGED,  KEEP DRY,  KEEP AWAY FROM SUNLIGHT,  PROTECT FROM HEAT AND RADIOACTIVE SOURCES.]: Brand: FLOSTEADY